# Patient Record
Sex: FEMALE | Race: BLACK OR AFRICAN AMERICAN | Employment: FULL TIME | URBAN - METROPOLITAN AREA
[De-identification: names, ages, dates, MRNs, and addresses within clinical notes are randomized per-mention and may not be internally consistent; named-entity substitution may affect disease eponyms.]

---

## 2017-01-17 ENCOUNTER — ROUTINE PRENATAL (OUTPATIENT)
Dept: OBGYN CLINIC | Age: 22
End: 2017-01-17

## 2017-01-17 ENCOUNTER — HOSPITAL ENCOUNTER (OUTPATIENT)
Dept: ULTRASOUND IMAGING | Age: 22
Discharge: HOME | End: 2017-01-17
Attending: ADVANCED PRACTICE MIDWIFE
Payer: MEDICAID

## 2017-01-17 VITALS
DIASTOLIC BLOOD PRESSURE: 72 MMHG | HEART RATE: 79 BPM | HEIGHT: 68 IN | BODY MASS INDEX: 34.89 KG/M2 | SYSTOLIC BLOOD PRESSURE: 119 MMHG | WEIGHT: 230.2 LBS

## 2017-01-17 DIAGNOSIS — Z34.01 ENCOUNTER FOR SUPERVISION OF NORMAL FIRST PREGNANCY IN FIRST TRIMESTER: ICD-10-CM

## 2017-01-17 DIAGNOSIS — Z34.01 ENCOUNTER FOR SUPERVISION OF NORMAL FIRST PREGNANCY IN FIRST TRIMESTER: Primary | ICD-10-CM

## 2017-01-17 PROCEDURE — 76817 TRANSVAGINAL US OBSTETRIC: CPT

## 2017-01-17 NOTE — PROGRESS NOTES
@ approx 9wks by LMP  Dating US scheduled for 17  No complaints, had 1 episode of vaginal spotting yesterday but none today  TV US today shows single IUP with visible fetal pole, no FHT  STAT US at [facility] today  No pain or other complaints from patient  Discussed prenatal labs - normal  Will call with US results  Pt verbalizes understanding  Visit ED if pain or bleeding today

## 2017-01-17 NOTE — PATIENT INSTRUCTIONS
Weeks 6 to 10 of Your Pregnancy: Care Instructions  Your Care Instructions    Congratulations on your pregnancy. This is an exciting and important time for you. During the first 6 to 10 weeks of your pregnancy, your body goes through many changes. Your baby grows very fast, even though you cannot feel it yet. You may start to notice that you feel different, both in your body and your emotions. Because each woman's pregnancy is unique, there is no right way to feel. You may feel the healthiest you have ever been, or you may feel tired or sick to your stomach (\"morning sickness\"). These early weeks are a time to make healthy choices and to eat the best foods for you and your baby. This care sheet will give you some ideas. This is also a good time to think about birth defects testing. These are tests done during pregnancy to look for possible problems with the baby. First trimester tests for birth defects can be done between 8 and 17 weeks of pregnancy, depending on the test. Talk with your doctor about what kinds of tests are available. Follow-up care is a key part of your treatment and safety. Be sure to make and go to all appointments, and call your doctor if you are having problems. It's also a good idea to know your test results and keep a list of the medicines you take. How can you care for yourself at home? Eat well  · Eat at least 3 meals and 2 healthy snacks every day. Eat fresh, whole foods, including:  ¨ 7 or more servings of bread, tortillas, cereal, rice, pasta, or oatmeal.  ¨ 3 or more servings of vegetables, especially leafy green vegetables. ¨ 2 or more servings of fruits. ¨ 3 or more servings of milk, yogurt, or cheese. ¨ 2 or more servings of meat, turkey, chicken, fish, eggs, or dried beans. · Drink plenty of fluids, especially water. Avoid sodas and other sweetened drinks. · Choose foods that have important vitamins for your baby, such as calcium, iron, and folate.   ¨ Dairy products, tofu, canned fish with bones, almonds, broccoli, dark leafy greens, corn tortillas, and fortified orange juice are good sources of calcium. ¨ Beef, poultry, liver, spinach, lentils, dried beans, fortified cereals, and dried fruits are rich in iron. ¨ Dark leafy greens, broccoli, asparagus, liver, fortified cereals, orange juice, peanuts, and almonds are good sources of folate. · Avoid foods that could harm your baby. ¨ Do not eat raw or undercooked meat, chicken, or fish (such as sushi or raw oysters). ¨ Do not eat raw eggs or foods that contain raw eggs, such as Caesar dressing. ¨ Do not eat soft cheeses and unpasteurized dairy foods, such as Brie, feta, or blue cheese. ¨ Do not eat fish that contains a lot of mercury, such as shark, swordfish, tilefish, or tonny mackerel. Do not eat more than 6 ounces of tuna each week. ¨ Do not eat raw sprouts, especially alfalfa sprouts. ¨ Cut down on caffeine, such as coffee, tea, and cola. Protect yourself and your baby  · Do not touch hans litter or cat feces. They can cause an infection that could harm your baby. · High body temperature can be harmful to your baby. So if you want to use a sauna or hot tub, be sure to talk to your doctor about how to use it safely. [place] with morning sickness  · Sip small amounts of water, juices, or shakes. Try drinking between meals, not with meals. · Eat 5 or 6 small meals a day. Try dry toast or crackers when you first get up, and eat breakfast a little later. · Avoid spicy, greasy, and fatty foods. · When you feel sick, open your windows or go for a short walk to get fresh air. · Try nausea wristbands. These help some women. · Tell your doctor if you think your prenatal vitamins make you sick. Where can you learn more? Go to http://jasmeet.info/. Enter G112 in the search box to learn more about \"Weeks 6 to 10 of Your Pregnancy: Care Instructions. \"  Current as of:  May 30, 2016  Content Version: 11.1  © 0842-0646 [employer]. Care instructions adapted under license by [employer] (which disclaims liability or warranty for this information). If you have questions about a medical condition or this instruction, always ask your healthcare professional. [address] any warranty or liability for your use of this information.

## 2017-01-19 ENCOUNTER — TELEPHONE (OUTPATIENT)
Dept: OBGYN CLINIC | Age: 22
End: 2017-01-19

## 2017-01-19 NOTE — TELEPHONE ENCOUNTER
Attempted to call patient at home/cell number listed to discuss US report, need for labs and plan of care. Pt did not show for yesterday's office visit. No answer, left non-detailed voice message to call our office upon receipt of voice message.

## 2017-01-26 ENCOUNTER — HOSPITAL ENCOUNTER (OUTPATIENT)
Dept: ULTRASOUND IMAGING | Age: 22
Discharge: HOME | End: 2017-01-26
Attending: ADVANCED PRACTICE MIDWIFE
Payer: MEDICAID

## 2017-01-26 ENCOUNTER — TELEPHONE (OUTPATIENT)
Dept: OBGYN CLINIC | Age: 22
End: 2017-01-26

## 2017-01-26 ENCOUNTER — HOSPITAL ENCOUNTER (OUTPATIENT)
Dept: LAB | Age: 22
Discharge: HOME | End: 2017-01-26
Payer: MEDICAID

## 2017-01-26 ENCOUNTER — OFFICE VISIT (OUTPATIENT)
Dept: OBGYN CLINIC | Age: 22
End: 2017-01-26

## 2017-01-26 DIAGNOSIS — O20.0 THREATENED MISCARRIAGE: ICD-10-CM

## 2017-01-26 DIAGNOSIS — O20.0 THREATENED MISCARRIAGE: Primary | ICD-10-CM

## 2017-01-26 DIAGNOSIS — O02.1 MISSED ABORTION: Primary | ICD-10-CM

## 2017-01-26 LAB — HCG SERPL-ACNC: 6267 MIU/ML (ref 0–10)

## 2017-01-26 PROCEDURE — 76817 TRANSVAGINAL US OBSTETRIC: CPT

## 2017-01-26 PROCEDURE — 84702 CHORIONIC GONADOTROPIN TEST: CPT | Performed by: ADVANCED PRACTICE MIDWIFE

## 2017-01-26 PROCEDURE — 36415 COLL VENOUS BLD VENIPUNCTURE: CPT | Performed by: ADVANCED PRACTICE MIDWIFE

## 2017-01-26 RX ORDER — IBUPROFEN 800 MG/1
800 TABLET ORAL
Qty: 21 TAB | Refills: 0 | Status: SHIPPED | OUTPATIENT
Start: 2017-01-26 | End: 2017-05-31

## 2017-01-26 RX ORDER — MISOPROSTOL 200 UG/1
TABLET ORAL
Qty: 4 TAB | Refills: 0 | Status: SHIPPED | OUTPATIENT
Start: 2017-01-26 | End: 2017-05-31

## 2017-01-26 NOTE — PATIENT INSTRUCTIONS

## 2017-01-26 NOTE — PROGRESS NOTES
at approx 10 wks by LMP and 9wk by 1st trim [address] at [facility] on  due to inability to see [id number] in office  No FHT detected, suspected fetal demise  Multiple attempts made to contact pt, pt did not show for f/u lab work and office visit  Patient notified of US report and need for beta HCG  Denies VB, pain, fever/chills  Beta collected, suspected cardiac flicker on abd US in office  STAT US ordered  Lab and formal US report pending  Discussed dx of threatened miscarriage  If no FHT on US and declining beta, discussed expectant management vs. cytotec induction vs. Surgical D&C  Patient will decide once results  Will call with results - pt tel # updated, brother's number is the best to reach her  Visit ED if pain or heavy bleeding  RTC in 2 days for beta hcg

## 2017-01-27 ENCOUNTER — HOSPITAL ENCOUNTER (OUTPATIENT)
Dept: LAB | Age: 22
Discharge: HOME | End: 2017-01-27
Payer: MEDICAID

## 2017-01-27 ENCOUNTER — OFFICE VISIT (OUTPATIENT)
Dept: OBGYN CLINIC | Age: 22
End: 2017-01-27

## 2017-01-27 VITALS
BODY MASS INDEX: 34.62 KG/M2 | HEIGHT: 68 IN | SYSTOLIC BLOOD PRESSURE: 119 MMHG | WEIGHT: 228.4 LBS | HEART RATE: 74 BPM | DIASTOLIC BLOOD PRESSURE: 80 MMHG

## 2017-01-27 DIAGNOSIS — O36.4XX0 FETAL DEMISE BEFORE 22 WEEKS WITH RETENTION OF DEAD FETUS: ICD-10-CM

## 2017-01-27 DIAGNOSIS — O02.1 MISSED ABORTION: Primary | ICD-10-CM

## 2017-01-27 DIAGNOSIS — O02.1 MISSED ABORTION: ICD-10-CM

## 2017-01-27 LAB — HCG SERPL-ACNC: 5609 MIU/ML (ref 0–10)

## 2017-01-27 PROCEDURE — 84702 CHORIONIC GONADOTROPIN TEST: CPT | Performed by: ADVANCED PRACTICE MIDWIFE

## 2017-01-27 NOTE — TELEPHONE ENCOUNTER
Spoke with patient via telephone using proper patient identifiers. Notified patient of US report that shows no cardiac activity and confirms fetal demise. No interval growth and decreased size. Patient denies having had any fever, chills, bleeding or pain since first US report on 1/17 that showed no cardiac activity and suspected fetal demise. Explained all options of expectant management, medical evacuation with cytotec or surgical D&C. Patient desires cytotec and would like to come into the office to have it placed. Patient reassured that this was not her fault and many first trimester missed ABs are due to chromosomal incompatibilities. Patient denies any questions and is scheduled for repeat beta HCG and cytotec insertion on 1/27 @ 9AM. She verbalizes understanding of all teaching.

## 2017-01-27 NOTE — PROGRESS NOTES
SUBJ: Ms. Pedro Singh is a 24 y.o. y/o female, , who presents today for     Chief Complaint   Patient presents with    Miscarriage       Her LMP was Patient's last menstrual period was 2016 (approximate). Koby Rothman presents for missed  and fetal demise. Demise was confirmed by two formal ultrasounds with the last one showing a decreasing fetal size. Patient has not had any bleeding or cramps x 1 week and desires medical evacuation after failed expectant management. She presents today to have cytotec placed as she is uncomfortable inserting vaginally at home. She denies fever, chills, pain or bleeding today. Mother is present with her. No past medical history on file. No past surgical history on file. Family History   Problem Relation Age of Onset    No Known Problems Mother     Hypertension Father     Hypertension Paternal Grandmother     Hypertension Paternal Grandfather      Social History     Social History    Marital status: SINGLE     Spouse name: N/A    Number of children: N/A    Years of education: N/A     Social History Main Topics    Smoking status: Current Every Day Smoker    Smokeless tobacco: Never Used    Alcohol use Yes    Drug use: Yes     Special: Marijuana    Sexual activity: Yes     Partners: Male     Birth control/ protection: None     Other Topics Concern    None     Social History Narrative         PE:   Visit Vitals    /80 (BP 1 Location: Left arm, BP Patient Position: Sitting)    Pulse 74    Ht 5' 8\" (1.727 m)    Wt 228 lb 6.4 oz (103.6 kg)    LMP 2016 (Approximate)    BMI 34.73 kg/m2       Pt is a well developed female who is alert and oriented x 3. CVS exam: normal rate, regular rhythm, normal S1, S2, no murmurs, rubs, clicks or gallops. Lungs: clear bilaterally to auscultation, no wheezing, rhonci or rales  Pelvic exam: normal external genitalia, vulva, vagina, cervix, uterus and adnexa.  Cytotec 800 mg was placed intravaginally without difficulty. Assessment/Plan:       1. Missed   Will repeat beta HCG Monday next week  - TOTAL HCG, QT.; Future    2.  Fetal demise before 22 weeks with retention of dead fetus  Discussed missed AB is not her fault, she is reassured   Discussed s/e of medical evacuation --> bleeding with possible clots and cramping  Motrin 800mg for pain management  Cautioned about retained products of conception  Visit ED if fever, chills, or increased bleeding or pain  Will try conception again after 1 menstrual cycle  She verbalizes understanding of all teaching  - TOTAL HCG, QT.; Future

## 2017-01-27 NOTE — PATIENT INSTRUCTIONS
Miscarriage: Care Instructions  Your Care Instructions    The loss of a pregnancy can be very hard. You may wonder why it happened or blame yourself. Miscarriages are common and are not caused by exercise, stress, or sex. Most happen because the fertilized egg in the uterus does not develop normally. There is no treatment that can stop a miscarriage. As long as you do not have heavy blood loss, fever, weakness, or other signs of infection, you can let a miscarriage follow its own course. This can take several days. Your body will recover over the next several weeks. Having a miscarriage does not mean you cannot have a normal pregnancy in the future. The doctor has checked you carefully, but problems can develop later. If you notice any problems or new symptoms, get medical treatment right away. Follow-up care is a key part of your treatment and safety. Be sure to make and go to all appointments, and call your doctor if you are having problems. It's also a good idea to know your test results and keep a list of the medicines you take. How can you care for yourself at home? · You will probably have some vaginal bleeding for 1 to 2 weeks. It may be similar to or slightly heavier than a normal period. The bleeding should get lighter after a week. Use pads instead of tampons. You may use tampons during your next period, which should start in 3 to 6 weeks. · Take an over-the-counter pain medicine, such as acetaminophen (Tylenol), ibuprofen (Advil, Motrin), or naproxen (Aleve) for cramps. Read and follow all instructions on the label. You may have cramps for several days after the miscarriage. · Do not take two or more pain medicines at the same time unless the doctor told you to. Many pain medicines have acetaminophen, which is Tylenol. Too much acetaminophen (Tylenol) can be harmful. · Use a clear container to save any tissue that you pass. Take it to your doctor's office as soon as you can.   · Do not have sex until the bleeding stops. · You may return to your normal activities if you feel well enough to do so. But you should avoid heavy exercise until the bleeding stops. · If you plan to get pregnant again, check with your doctor. Most doctors suggest waiting until you have had at least one normal period before you try to get pregnant. · If you do not want to get pregnant, ask your doctor about birth control. You can get pregnant again before your next period starts if you are not using birth control. · You may be low in iron because of blood loss. Eat a balanced diet that is high in iron and vitamin C. Foods rich in iron include red meat, shellfish, eggs, beans, and leafy green vegetables. Foods high in vitamin C include citrus fruits, tomatoes, and broccoli. Talk to your doctor about whether you need to take iron pills or a multivitamin. · The loss of a pregnancy can be very hard. You may wonder why it happened and blame yourself. Talking to family members, friends, a counselor, or your doctor may help you cope with your loss. When should you call for help? Call 911 anytime you think you may need emergency care. For example, call if:  · You have sudden, severe pain in your belly or pelvis. · You passed out (lost consciousness). · You have severe vaginal bleeding. Call your doctor now or seek immediate medical care if:  · You are dizzy or lightheaded, or you feel like you may faint. · You have new or increased pain in your belly or pelvis. · Your vaginal bleeding is getting worse. · You have increased pain in the vaginal area. · You have a fever. Watch closely for changes in your health, and be sure to contact your doctor if:  · You have new or worse vaginal discharge. · You do not get better as expected. Where can you learn more? Go to http://venecia-trudy.info/. Enter E802 in the search box to learn more about \"Miscarriage: Care Instructions. \"  Current as of:  May 30, 2016  Content Version: 11.1  © 9520-4423 [employer]. Care instructions adapted under license by [employer] (which disclaims liability or warranty for this information). If you have questions about a medical condition or this instruction, always ask your healthcare professional. [address] any warranty or liability for your use of this information.

## 2017-05-31 ENCOUNTER — ROUTINE PRENATAL (OUTPATIENT)
Dept: OBGYN CLINIC | Age: 22
End: 2017-05-31

## 2017-05-31 VITALS
HEART RATE: 88 BPM | HEIGHT: 68 IN | WEIGHT: 230 LBS | BODY MASS INDEX: 34.86 KG/M2 | DIASTOLIC BLOOD PRESSURE: 88 MMHG | SYSTOLIC BLOOD PRESSURE: 138 MMHG

## 2017-05-31 DIAGNOSIS — Z3A.09 9 WEEKS GESTATION OF PREGNANCY: Primary | ICD-10-CM

## 2017-05-31 RX ORDER — METRONIDAZOLE 500 MG/1
TABLET ORAL 3 TIMES DAILY
COMMUNITY
End: 2017-07-15

## 2017-05-31 NOTE — PATIENT INSTRUCTIONS
Weeks 6 to 10 of Your Pregnancy: Care Instructions  Your Care Instructions    Congratulations on your pregnancy. This is an exciting and important time for you. During the first 6 to 10 weeks of your pregnancy, your body goes through many changes. Your baby grows very fast, even though you cannot feel it yet. You may start to notice that you feel different, both in your body and your emotions. Because each woman's pregnancy is unique, there is no right way to feel. You may feel the healthiest you have ever been, or you may feel tired or sick to your stomach (\"morning sickness\"). These early weeks are a time to make healthy choices and to eat the best foods for you and your baby. This care sheet will give you some ideas. This is also a good time to think about birth defects testing. These are tests done during pregnancy to look for possible problems with the baby. First trimester tests for birth defects can be done between 8 and 17 weeks of pregnancy, depending on the test. Talk with your doctor about what kinds of tests are available. Follow-up care is a key part of your treatment and safety. Be sure to make and go to all appointments, and call your doctor if you are having problems. It's also a good idea to know your test results and keep a list of the medicines you take. How can you care for yourself at home? Eat well  · Eat at least 3 meals and 2 healthy snacks every day. Eat fresh, whole foods, including:  ¨ 7 or more servings of bread, tortillas, cereal, rice, pasta, or oatmeal.  ¨ 3 or more servings of vegetables, especially leafy green vegetables. ¨ 2 or more servings of fruits. ¨ 3 or more servings of milk, yogurt, or cheese. ¨ 2 or more servings of meat, turkey, chicken, fish, eggs, or dried beans. · Drink plenty of fluids, especially water. Avoid sodas and other sweetened drinks. · Choose foods that have important vitamins for your baby, such as calcium, iron, and folate.   ¨ Dairy products, tofu, canned fish with bones, almonds, broccoli, dark leafy greens, corn tortillas, and fortified orange juice are good sources of calcium. ¨ Beef, poultry, liver, spinach, lentils, dried beans, fortified cereals, and dried fruits are rich in iron. ¨ Dark leafy greens, broccoli, asparagus, liver, fortified cereals, orange juice, peanuts, and almonds are good sources of folate. · Avoid foods that could harm your baby. ¨ Do not eat raw or undercooked meat, chicken, or fish (such as sushi or raw oysters). ¨ Do not eat raw eggs or foods that contain raw eggs, such as Caesar dressing. ¨ Do not eat soft cheeses and unpasteurized dairy foods, such as Brie, feta, or blue cheese. ¨ Do not eat fish that contains a lot of mercury, such as shark, swordfish, tilefish, or tonny mackerel. Do not eat more than 6 ounces of tuna each week. ¨ Do not eat raw sprouts, especially alfalfa sprouts. ¨ Cut down on caffeine, such as coffee, tea, and cola. Protect yourself and your baby  · Do not touch hans litter or cat feces. They can cause an infection that could harm your baby. · High body temperature can be harmful to your baby. So if you want to use a sauna or hot tub, be sure to talk to your doctor about how to use it safely. [place] with morning sickness  · Sip small amounts of water, juices, or shakes. Try drinking between meals, not with meals. · Eat 5 or 6 small meals a day. Try dry toast or crackers when you first get up, and eat breakfast a little later. · Avoid spicy, greasy, and fatty foods. · When you feel sick, open your windows or go for a short walk to get fresh air. · Try nausea wristbands. These help some women. · Tell your doctor if you think your prenatal vitamins make you sick. Where can you learn more? Go to http://jasmeet.info/. Enter G112 in the search box to learn more about \"Weeks 6 to 10 of Your Pregnancy: Care Instructions. \"  Current as of:  May 30, 2016  Content Version: 11.2  © 2556-6267 [employer]. Care instructions adapted under license by [employer] (which disclaims liability or warranty for this information). If you have questions about a medical condition or this instruction, always ask your healthcare professional. [address] any warranty or liability for your use of this information. Learning About Birth Defects Testing  What is birth defects testing? Birth defects testing is done during pregnancy to look for possible problems with the baby (fetus). A birth defect may have only a minor impact on a child's life. Or it can have a major effect on quality or length of life. You and your doctor can choose from many tests. You may have no tests, one test, or many tests. Talking with a genetic counselor may help you make decisions about testing. The counselor is trained to help you understand these tests. He or she can also help you find resources for support. What are the types of tests? You may have a screening test or a diagnostic test. Or you may have both types of tests. Screening tests show the chance that a baby has a certain birth defect, such as Down syndrome, spina bifida, or trisomy 25. Diagnostic tests show if a baby has a certain birth defect. · Screening tests and when they are done:  ¨ Blood tests at 10 to 13 weeks (first trimester)  ¨ Cell free fetal DNA test at 10 weeks or later (first trimester)  ¨ Nuchal translucency test at 11 to 14 weeks (first trimester)  ¨ Triple or quad screening at 15 to 20 weeks (second trimester)  ¨ Ultrasound at 18 to 20 weeks (second trimester)  · Diagnostic tests and when they are done:  ¨ Chorionic villus sampling (CVS) at 10 to 12 weeks (first trimester)  ¨ Amniocentesis at 13 to 20 weeks (second trimester)  In some cases, the doctors look at the combined screenings that you've had over a period of time. This is called an integrated screening.   What are the screening tests? · Blood tests are done to look at different substances in your blood. These tests include cell free fetal DNA and triple or quadruple (quad) blood tests. Both of these tests can help find genetic problems. · Nuchal translucency test uses ultrasound to measure the thickness of the area at the back of the baby's neck. An increase in thickness can be an early sign of certain birth defects. Ultrasound is a tool that uses sound waves to make pictures of your baby and placenta inside the uterus. · Ultrasound lets your doctor see an image of your baby. It can help your doctor look for problems of the heart, spine, belly, or other areas. What are the diagnostic tests? Chorionic villus sampling (CVS) looks at cells from the placenta. To do the test, your doctor may put a thin tube through your vagina and cervix to take out a small piece of the placenta. Or the doctor may take out the piece through a needle in your belly. This test can diagnose many genetic diseases. But it can't find problems with the spinal cord. Amniocentesis looks at the amniotic fluid that surrounds your baby. Your doctor will put a needle through your belly into your uterus and take out a very small amount of fluid to test.  What are the risks of these tests? There is a small risk of a miscarriage after a CVS or amniocentesis. Your doctor or genetic counselor can help you understand this risk. These tests are generally very safe. Where can you learn more? Go to http://venecia-trudy.info/. Enter G030 in the search box to learn more about \"Learning About Birth Defects Testing. \"  Current as of: June 30, 2016  Content Version: 11.2  © 1476-5367 [employer]. Care instructions adapted under license by [employer] (which disclaims liability or warranty for this information).  If you have questions about a medical condition or this instruction, always ask your healthcare professional. [employer] disclaims any warranty or liability for your use of this information.

## 2017-05-31 NOTE — PROGRESS NOTES
9.3 Weeks  Dated by LMP c/w 9 week US  See flow sheet  1st trimester testing desired  Refer to [facility]  RTC 4 weeks

## 2017-05-31 NOTE — MR AVS SNAPSHOT
Visit Information Date & Time Provider Department Dept. Phone Encounter #  
 5/31/2017  9:30 AM Anahi Reyes [place] [id number] Follow-up Instructions Return in about 4 weeks (around 6/28/2017). Upcoming Health Maintenance Date Due  
 HPV AGE 9Y-34Y (1 of 3 - Female 3 Dose Series) 12/4/2006 INFLUENZA AGE 9 TO ADULT 8/1/2017 PAP AKA CERVICAL CYTOLOGY 12/20/2019 Allergies as of 5/31/2017  Review Complete On: 5/31/2017 By: Anahi Reyes MD  
 No Known Allergies Current Immunizations  Never Reviewed No immunizations on file. Not reviewed this visit You Were Diagnosed With   
  
 Codes Comments 9 weeks gestation of pregnancy    -  Primary ICD-10-CM: Z3A.09 
ICD-9-CM: V22.2 Vitals BP Pulse Height(growth percentile) Weight(growth percentile) LMP Breastfeeding? 138/88 (BP 1 Location: Right arm, BP Patient Position: Sitting) 88 5' 8\" (1.727 m) 230 lb (104.3 kg) 03/26/2017 Unknown BMI OB Status Smoking Status 34.97 kg/m2 Pregnant Former Smoker BMI and BSA Data Body Mass Index Body Surface Area 34.97 kg/m 2 2.24 m 2 Preferred Pharmacy Pharmacy Name Phone [facility] [phone] Your Updated Medication List  
  
   
This list is accurate as of: 5/31/17 11:13 AM.  Always use your most recent med list.  
  
  
  
  
 FLAGYL 500 mg tablet Generic drug:  metroNIDAZOLE Take  by mouth three (3) times daily. prenatal vit-iron fumarate-fa 27-0.8 mg Tab tablet Commonly known as:  PRENATAL VITAMIN Take 1 Tab by mouth daily. Follow-up Instructions Return in about 4 weeks (around 6/28/2017). Patient Instructions Weeks 6 to 10 of Your Pregnancy: Care Instructions Your Care Instructions Congratulations on your pregnancy. This is an exciting and important time for you. During the first 6 to 10 weeks of your pregnancy, your body goes through many changes. Your baby grows very fast, even though you cannot feel it yet. You may start to notice that you feel different, both in your body and your emotions. Because each woman's pregnancy is unique, there is no right way to feel. You may feel the healthiest you have ever been, or you may feel tired or sick to your stomach (\"morning sickness\"). These early weeks are a time to make healthy choices and to eat the best foods for you and your baby. This care sheet will give you some ideas. This is also a good time to think about birth defects testing. These are tests done during pregnancy to look for possible problems with the baby. First trimester tests for birth defects can be done between 8 and 17 weeks of pregnancy, depending on the test. Talk with your doctor about what kinds of tests are available. Follow-up care is a key part of your treatment and safety. Be sure to make and go to all appointments, and call your doctor if you are having problems. It's also a good idea to know your test results and keep a list of the medicines you take. How can you care for yourself at home? Eat well · Eat at least 3 meals and 2 healthy snacks every day. Eat fresh, whole foods, including: ¨ 7 or more servings of bread, tortillas, cereal, rice, pasta, or oatmeal. 
¨ 3 or more servings of vegetables, especially leafy green vegetables. ¨ 2 or more servings of fruits. ¨ 3 or more servings of milk, yogurt, or cheese. ¨ 2 or more servings of meat, turkey, chicken, fish, eggs, or dried beans. · Drink plenty of fluids, especially water. Avoid sodas and other sweetened drinks. · Choose foods that have important vitamins for your baby, such as calcium, iron, and folate. ¨ Dairy products, tofu, canned fish with bones, almonds, broccoli, dark leafy greens, corn tortillas, and fortified orange juice are good sources of calcium. ¨ Beef, poultry, liver, spinach, lentils, dried beans, fortified cereals, and dried fruits are rich in iron. ¨ Dark leafy greens, broccoli, asparagus, liver, fortified cereals, orange juice, peanuts, and almonds are good sources of folate. · Avoid foods that could harm your baby. ¨ Do not eat raw or undercooked meat, chicken, or fish (such as sushi or raw oysters). ¨ Do not eat raw eggs or foods that contain raw eggs, such as Caesar dressing. ¨ Do not eat soft cheeses and unpasteurized dairy foods, such as Brie, feta, or blue cheese. ¨ Do not eat fish that contains a lot of mercury, such as shark, swordfish, tilefish, or tonny mackerel. Do not eat more than 6 ounces of tuna each week. ¨ Do not eat raw sprouts, especially alfalfa sprouts. ¨ Cut down on caffeine, such as coffee, tea, and cola. Protect yourself and your baby · Do not touch hans litter or cat feces. They can cause an infection that could harm your baby. · High body temperature can be harmful to your baby. So if you want to use a sauna or hot tub, be sure to talk to your doctor about how to use it safely. [place] with morning sickness · Sip small amounts of water, juices, or shakes. Try drinking between meals, not with meals. · Eat 5 or 6 small meals a day. Try dry toast or crackers when you first get up, and eat breakfast a little later. · Avoid spicy, greasy, and fatty foods. · When you feel sick, open your windows or go for a short walk to get fresh air. · Try nausea wristbands. These help some women. · Tell your doctor if you think your prenatal vitamins make you sick. Where can you learn more? Go to http://venecia-trudy.info/. Enter G112 in the search box to learn more about \"Weeks 6 to 10 of Your Pregnancy: Care Instructions. \" Current as of: May 30, 2016 Content Version: 11.2 © 9935-9807 [employer].  Care instructions adapted under license by [address] (which disclaims liability or warranty for this information). If you have questions about a medical condition or this instruction, always ask your healthcare professional. [address] any warranty or liability for your use of this information. Learning About Birth Defects Testing What is birth defects testing? Birth defects testing is done during pregnancy to look for possible problems with the baby (fetus). A birth defect may have only a minor impact on a child's life. Or it can have a major effect on quality or length of life. You and your doctor can choose from many tests. You may have no tests, one test, or many tests. Talking with a genetic counselor may help you make decisions about testing. The counselor is trained to help you understand these tests. He or she can also help you find resources for support. What are the types of tests? You may have a screening test or a diagnostic test. Or you may have both types of tests. Screening tests show the chance that a baby has a certain birth defect, such as Down syndrome, spina bifida, or trisomy 25. Diagnostic tests show if a baby has a certain birth defect. · Screening tests and when they are done: ¨ Blood tests at 10 to 13 weeks (first trimester) ¨ Cell free fetal DNA test at 10 weeks or later (first trimester) ¨ Nuchal translucency test at 11 to 14 weeks (first trimester) ¨ Triple or quad screening at 15 to 20 weeks (second trimester) ¨ Ultrasound at 18 to 20 weeks (second trimester) · Diagnostic tests and when they are done: ¨ Chorionic villus sampling (CVS) at 10 to 12 weeks (first trimester) ¨ Amniocentesis at 13 to 20 weeks (second trimester) In some cases, the doctors look at the combined screenings that you've had over a period of time. This is called an integrated screening. What are the screening tests? · Blood tests are done to look at different substances in your blood. These tests include cell free fetal DNA and triple or quadruple (quad) blood tests. Both of these tests can help find genetic problems. · Nuchal translucency test uses ultrasound to measure the thickness of the area at the back of the baby's neck. An increase in thickness can be an early sign of certain birth defects. Ultrasound is a tool that uses sound waves to make pictures of your baby and placenta inside the uterus. · Ultrasound lets your doctor see an image of your baby. It can help your doctor look for problems of the heart, spine, belly, or other areas. What are the diagnostic tests? Chorionic villus sampling (CVS) looks at cells from the placenta. To do the test, your doctor may put a thin tube through your vagina and cervix to take out a small piece of the placenta. Or the doctor may take out the piece through a needle in your belly. This test can diagnose many genetic diseases. But it can't find problems with the spinal cord. Amniocentesis looks at the amniotic fluid that surrounds your baby. Your doctor will put a needle through your belly into your uterus and take out a very small amount of fluid to test. 
What are the risks of these tests? There is a small risk of a miscarriage after a CVS or amniocentesis. Your doctor or genetic counselor can help you understand this risk. These tests are generally very safe. Where can you learn more? Go to http://venecia-trudy.info/. Enter G030 in the search box to learn more about \"Learning About Birth Defects Testing. \" Current as of: June 30, 2016 Content Version: 11.2 © 4047-4653 [employer]. Care instructions adapted under license by [employer] (which disclaims liability or warranty for this information). If you have questions about a medical condition or this instruction, always ask your healthcare professional. [address] any warranty or liability for your use of this information. Please provide this summary of care documentation to your next provider. Your primary care clinician is listed as Destiny Cedeño. If you have any questions after today's visit, please call [phone].

## 2017-06-01 DIAGNOSIS — Z3A.09 9 WEEKS GESTATION OF PREGNANCY: ICD-10-CM

## 2017-06-01 LAB
ANTIBODY SCREEN, EXTERNAL: NEGATIVE
CHLAMYDIA TRACHOMATIS THINPREP, 13342: NEGATIVE
HBSAG, EXTERNAL: NEGATIVE
NEISSERIA GONORRHOEAE THINPREP, 13343: NEGATIVE
PAP IMAGE GUIDED, 8900296: NORMAL
RUBELLA, EXTERNAL: NORMAL
T. PALLIDUM, EXTERNAL: NON REACTIVE
TRICHOMONAS NUC AMP-THIN PREP,13357: NEGATIVE
TYPE, ABO & RH, EXTERNAL: NORMAL

## 2017-06-17 ENCOUNTER — APPOINTMENT (OUTPATIENT)
Dept: ULTRASOUND IMAGING | Age: 22
End: 2017-06-17
Attending: PHYSICIAN ASSISTANT
Payer: MEDICAID

## 2017-06-17 ENCOUNTER — HOSPITAL ENCOUNTER (EMERGENCY)
Age: 22
Discharge: HOME | End: 2017-06-17
Attending: EMERGENCY MEDICINE | Admitting: EMERGENCY MEDICINE
Payer: MEDICAID

## 2017-06-17 VITALS
WEIGHT: 220 LBS | BODY MASS INDEX: 33.45 KG/M2 | RESPIRATION RATE: 14 BRPM | HEART RATE: 88 BPM | OXYGEN SATURATION: 98 % | SYSTOLIC BLOOD PRESSURE: 125 MMHG | DIASTOLIC BLOOD PRESSURE: 85 MMHG | TEMPERATURE: 98.9 F

## 2017-06-17 DIAGNOSIS — N76.0 BV (BACTERIAL VAGINOSIS): ICD-10-CM

## 2017-06-17 DIAGNOSIS — B96.89 BV (BACTERIAL VAGINOSIS): ICD-10-CM

## 2017-06-17 DIAGNOSIS — O20.0 THREATENED MISCARRIAGE: Primary | ICD-10-CM

## 2017-06-17 LAB
ALBUMIN SERPL BCP-MCNC: 3.5 G/DL (ref 3.4–5)
ALBUMIN/GLOB SERPL: 1 {RATIO} (ref 0.8–1.7)
ALP SERPL-CCNC: 44 U/L (ref 45–117)
ALT SERPL-CCNC: 15 U/L (ref 13–56)
ANION GAP BLD CALC-SCNC: 7 MMOL/L (ref 3–18)
APPEARANCE UR: ABNORMAL
AST SERPL W P-5'-P-CCNC: 12 U/L (ref 15–37)
BACTERIA URNS QL MICRO: ABNORMAL /HPF
BASOPHILS # BLD AUTO: 0 K/UL (ref 0–0.1)
BASOPHILS # BLD: 0 % (ref 0–2)
BILIRUB SERPL-MCNC: 0.4 MG/DL (ref 0.2–1)
BILIRUB UR QL: NEGATIVE
BUN SERPL-MCNC: 5 MG/DL (ref 7–18)
BUN/CREAT SERPL: 7 (ref 12–20)
CALCIUM SERPL-MCNC: 9.5 MG/DL (ref 8.5–10.1)
CHLORIDE SERPL-SCNC: 106 MMOL/L (ref 100–108)
CO2 SERPL-SCNC: 25 MMOL/L (ref 21–32)
COLOR UR: ABNORMAL
CREAT SERPL-MCNC: 0.73 MG/DL (ref 0.6–1.3)
DIFFERENTIAL METHOD BLD: ABNORMAL
EOSINOPHIL # BLD: 0.1 K/UL (ref 0–0.4)
EOSINOPHIL NFR BLD: 1 % (ref 0–5)
EPITH CASTS URNS QL MICRO: ABNORMAL /LPF (ref 0–5)
ERYTHROCYTE [DISTWIDTH] IN BLOOD BY AUTOMATED COUNT: 13.7 % (ref 11.6–14.5)
GLOBULIN SER CALC-MCNC: 3.4 G/DL (ref 2–4)
GLUCOSE SERPL-MCNC: 80 MG/DL (ref 74–99)
GLUCOSE UR STRIP.AUTO-MCNC: NEGATIVE MG/DL
HCG SERPL QL: POSITIVE
HCG SERPL-ACNC: ABNORMAL MIU/ML (ref 1–6)
HCT VFR BLD AUTO: 35.9 % (ref 35–45)
HGB BLD-MCNC: 12.6 G/DL (ref 12–16)
HGB UR QL STRIP: ABNORMAL
KETONES UR QL STRIP.AUTO: NEGATIVE MG/DL
LEUKOCYTE ESTERASE UR QL STRIP.AUTO: ABNORMAL
LYMPHOCYTES # BLD AUTO: 24 % (ref 21–52)
LYMPHOCYTES # BLD: 3 K/UL (ref 0.9–3.6)
MCH RBC QN AUTO: 28.6 PG (ref 24–34)
MCHC RBC AUTO-ENTMCNC: 35.1 G/DL (ref 31–37)
MCV RBC AUTO: 81.4 FL (ref 74–97)
MONOCYTES # BLD: 1.2 K/UL (ref 0.05–1.2)
MONOCYTES NFR BLD AUTO: 9 % (ref 3–10)
NEUTS SEG # BLD: 8.4 K/UL (ref 1.8–8)
NEUTS SEG NFR BLD AUTO: 66 % (ref 40–73)
NITRITE UR QL STRIP.AUTO: NEGATIVE
PH UR STRIP: 6 [PH] (ref 5–8)
PLATELET # BLD AUTO: 283 K/UL (ref 135–420)
PMV BLD AUTO: 9.2 FL (ref 9.2–11.8)
POTASSIUM SERPL-SCNC: 3.7 MMOL/L (ref 3.5–5.5)
PROT SERPL-MCNC: 6.9 G/DL (ref 6.4–8.2)
PROT UR STRIP-MCNC: 100 MG/DL
RBC # BLD AUTO: 4.41 M/UL (ref 4.2–5.3)
RBC #/AREA URNS HPF: ABNORMAL /HPF (ref 0–5)
SERVICE CMNT-IMP: NORMAL
SODIUM SERPL-SCNC: 138 MMOL/L (ref 136–145)
SP GR UR REFRACTOMETRY: 1.01 (ref 1–1.03)
UROBILINOGEN UR QL STRIP.AUTO: 0.2 EU/DL (ref 0.2–1)
WBC # BLD AUTO: 12.7 K/UL (ref 4.6–13.2)
WBC URNS QL MICRO: ABNORMAL /HPF (ref 0–4)
WET PREP GENITAL: NORMAL

## 2017-06-17 PROCEDURE — 99284 EMERGENCY DEPT VISIT MOD MDM: CPT

## 2017-06-17 PROCEDURE — 87491 CHLMYD TRACH DNA AMP PROBE: CPT | Performed by: PHYSICIAN ASSISTANT

## 2017-06-17 PROCEDURE — 84702 CHORIONIC GONADOTROPIN TEST: CPT | Performed by: PHYSICIAN ASSISTANT

## 2017-06-17 PROCEDURE — 76801 OB US < 14 WKS SINGLE FETUS: CPT

## 2017-06-17 PROCEDURE — 80053 COMPREHEN METABOLIC PANEL: CPT | Performed by: EMERGENCY MEDICINE

## 2017-06-17 PROCEDURE — 81001 URINALYSIS AUTO W/SCOPE: CPT | Performed by: EMERGENCY MEDICINE

## 2017-06-17 PROCEDURE — 84703 CHORIONIC GONADOTROPIN ASSAY: CPT | Performed by: PHYSICIAN ASSISTANT

## 2017-06-17 PROCEDURE — 87210 SMEAR WET MOUNT SALINE/INK: CPT | Performed by: PHYSICIAN ASSISTANT

## 2017-06-17 PROCEDURE — 85025 COMPLETE CBC W/AUTO DIFF WBC: CPT | Performed by: EMERGENCY MEDICINE

## 2017-06-17 PROCEDURE — 88305 TISSUE EXAM BY PATHOLOGIST: CPT | Performed by: EMERGENCY MEDICINE

## 2017-06-17 RX ORDER — METRONIDAZOLE 500 MG/1
500 TABLET ORAL 2 TIMES DAILY
Qty: 14 TAB | Refills: 0 | Status: SHIPPED | OUTPATIENT
Start: 2017-06-17 | End: 2017-06-24

## 2017-06-17 NOTE — ED TRIAGE NOTES
Pt is 3 months pregnant, states she woke up this morning to find blood in the bed and on toilet paper when she wiped, \"burgendy colored\" on bed but bright red when she wiped, stated it didn't look like period blood states the pt. Pt also c/o 5/10 lower abdominal pain that started on the way here which she describes as sharp. Pt has had adequate prenatal care.

## 2017-06-17 NOTE — ED PROVIDER NOTES
HPI Comments: Mayra Diez is a 24 y.o. female [id number] at approximately 3 months gestation who presents to the emergency department for evaluation of vaginal bleeding and pelvic pain in pregnancy. Pt states she awoke tonight with a wet spot of blood on the mattress and then noticed bright red blood when she wiped. She relates one prior episode of bleeding approximately 3-4 weeks ago, but states nothing this heavy. She states she had an US at [facility] confirming IUP at that time, but there is no . She sees OB Jaswant and another OB at that practice. Pt reports she had a miscarriage back in January at approximately 8-9 weeks gestation and was told it was secondary to [address]. Pt denies any vaginal discharge/odor, fevers or chills, headache, dizziness or light headedness, ENT issues, CP or discomfort, SOB, cough, n/v/d/c, back pain, dysuria, frequency, focal weakness/numbness/tingling, or rash. Patient has no other complaints at this time. LMP 3/26/17. PCP:  Abiola Gold CNM        Patient is a 24 y.o. female presenting with vaginal bleeding. Vaginal Bleeding   Pertinent negatives include no chest pain, no headaches and no shortness of breath. History reviewed. No pertinent past medical history. History reviewed. No pertinent surgical history. Family History:   Problem Relation Age of Onset    No Known Problems Mother     Hypertension Father     Hypertension Paternal Grandmother     Stroke Paternal Grandmother     Hypertension Paternal Grandfather        Social History     Social History    Marital status: SINGLE     Spouse name: N/A    Number of children: N/A    Years of education: N/A     Occupational History    Not on file.      Social History Main Topics    Smoking status: Former Smoker     Quit date: 2017    Smokeless tobacco: Never Used    Alcohol use No    Drug use: Yes     Special: Marijuana      Comment: smokes daily    Sexual activity: Yes Partners: Male     Birth control/ protection: None     Other Topics Concern    Not on file     Social History Narrative         ALLERGIES: Review of patient's allergies indicates no known allergies. Review of Systems   Constitutional: Negative for chills and fever. HENT: Negative for congestion, rhinorrhea and sore throat. Respiratory: Negative for cough and shortness of breath. Cardiovascular: Negative for chest pain. Gastrointestinal: Negative for constipation, diarrhea, nausea and vomiting. Genitourinary: Positive for pelvic pain and vaginal bleeding. Negative for dysuria, frequency and hematuria. Musculoskeletal: Negative for back pain and myalgias. Skin: Negative for rash and wound. Neurological: Negative for dizziness and headaches. Vitals:    06/17/17 0534 06/17/17 0545 06/17/17 0700   BP: 128/87 (!) 123/98 125/85   Pulse: 87  88   Resp: 16  14   Temp: 98.9 °F (37.2 °C)     SpO2: 100% 100% 98%   Weight: 99.8 kg (220 lb)              Physical Exam   Constitutional: She is oriented to person, place, and time. She appears well-developed and well-nourished. No distress. HENT:   Head: Normocephalic and atraumatic. Eyes: Conjunctivae are normal.   Neck: Normal range of motion. Neck supple. Cardiovascular: Normal rate, regular rhythm and normal heart sounds. Exam reveals no gallop and no friction rub. No murmur heard. Pulmonary/Chest: Effort normal and breath sounds normal. No respiratory distress. She has no wheezes. She has no rales. She exhibits no tenderness. Abdominal: Soft. Bowel sounds are normal. She exhibits no distension and no mass. There is tenderness. There is no rebound and no guarding. Tenderness to deep palpation noted to suprapubic region. No rebound, rigidity, guarding, distention, or mass noted. (-) Carson's sign. (-) Rovsing's sign. Normoactive BS all four quadrants.      Genitourinary:   Genitourinary Comments: Pelvic Exam:    Speculum and bimanual pelvic exam performed at bedside with chaperone assistance of Daxa Aquino [facility]. Normal exam of external labia majora and minora without apparent rash or lesions. There was moderate amount of dark red vaginal bleeding with silver dollar sized clot with what appears to be tissue in vault. However, cervical os was closed. No adnexal masses or tenderness appreciated. Patient tolerated procedure well. No CMT. Cytology and samples obtained for GC/Chlamydia and wet prep, labeled at bedside and sent to lab.  6:25 AM     Musculoskeletal: She exhibits no edema or deformity. Neurological: She is alert and oriented to person, place, and time. She has normal reflexes. Skin: Skin is warm and dry. She is not diaphoretic. Psychiatric: She has a normal mood and affect. Nursing note and vitals reviewed. MDM  Number of Diagnoses or Management Options  BV (bacterial vaginosis): new and requires workup  Threatened miscarriage: new and requires workup  Diagnosis management comments: Differential Diagnosis:  Menses, anovulation/DUB, spontaneous , threatened , pregnancy, uterine leiomyomas, cervical or endometrial polyps, pelvic tumors, atrophic endometrium, systemic disorders, hypothyroidism, pelvic infections, exogenous hormone use, coagulopathy, trauma    Plan: Pt presents in NAD, vitals wnl. Silver dollar sized solid clot with questionable formed tissue noted in vaginal vault on speculum exam.  Closed os. This was sent to lab. Pt is Rh positive. H&H adequate. Qt will not be back prior to discharge as this was a send-out today per lab. Cannot confirm IUP by prior ultrasound in our system or [facility] for this pregnancy. TV US today confirms viable 11w5d IUP without abnormality. Wet prep with BV. Will treat. Advised on pelvic rest and no intercourse. Follow-up closely with OB in 2 days. At this time, patient is stable and appropriate for discharge home.   Patient demonstrates understanding of current diagnoses and is in agreement with the treatment plan. They are advised that while the likelihood of serious underlying condition is low at this point given the evaluation performed today, we cannot fully rule it out. They are advised to immediately return with any new symptoms or worsening of current condition. All questions have been answered. Patient is given educational material regarding their diagnoses, including danger symptoms and when to return to the ED. Amount and/or Complexity of Data Reviewed  Clinical lab tests: ordered and reviewed  Tests in the radiology section of CPT®: ordered and reviewed  Review and summarize past medical records: yes    Risk of Complications, Morbidity, and/or Mortality  Presenting problems: moderate  Diagnostic procedures: high  Management options: moderate    Patient Progress  Patient progress: improved    ED Course       Procedures    -------------------------------------------------------------------------------------------------------------------  Orders:  Orders Placed This Encounter    WET PREP     Standing Status:   Standing     Number of Occurrences:   1    US PREG UTS < 14 WKS SNGL     Standing Status:   Standing     Number of Occurrences:   1     Order Specific Question:   Transport     Answer:   Wheelchair [7]     Order Specific Question:   Is Patient Pregnant? Answer:    Yes    CBC WITH AUTOMATED DIFF     Standing Status:   Standing     Number of Occurrences:   1    METABOLIC PANEL, COMPREHENSIVE     Standing Status:   Standing     Number of Occurrences:   1    URINALYSIS W/ RFLX MICROSCOPIC     Standing Status:   Standing     Number of Occurrences:   1    TOTAL HCG, QT.     Standing Status:   Standing     Number of Occurrences:   1    CHLAMYDIA/NEISSERIA AMPLIFICATION     Standing Status:   Standing     Number of Occurrences:   1     Order Specific Question:   Specimen type/source     Answer:   Endocervical [538]    URINE MICROSCOPIC ONLY     Standing Status:   Standing     Number of Occurrences:   1    HCG QL SERUM     Standing Status:   Standing     Number of Occurrences:   1    PATHOLOGY SPECIMEN TO LAB     Possible products of conception     Standing Status:   Standing     Number of Occurrences:   1    metroNIDAZOLE (FLAGYL) 500 mg tablet     Sig: Take 1 Tab by mouth two (2) times a day for 7 days. Dispense:  14 Tab     Refill:  0        Lab Results:   Recent Results (from the past 12 hour(s))   URINALYSIS W/ RFLX MICROSCOPIC    Collection Time: 06/17/17  5:40 AM   Result Value Ref Range    Color RED      Appearance CLOUDY      Specific gravity 1.013 1.005 - 1.030      pH (UA) 6.0 5.0 - 8.0      Protein 100 (A) NEG mg/dL    Glucose NEGATIVE  NEG mg/dL    Ketone NEGATIVE  NEG mg/dL    Bilirubin NEGATIVE  NEG      Blood LARGE (A) NEG      Urobilinogen 0.2 0.2 - 1.0 EU/dL    Nitrites NEGATIVE  NEG      Leukocyte Esterase TRACE (A) NEG     URINE MICROSCOPIC ONLY    Collection Time: 06/17/17  5:40 AM   Result Value Ref Range    WBC 0 to 3 0 - 4 /hpf    RBC TOO NUMEROUS TO COUNT 0 - 5 /hpf    Epithelial cells 2+ 0 - 5 /lpf    Bacteria 2+ (A) NEG /hpf   CBC WITH AUTOMATED DIFF    Collection Time: 06/17/17  5:53 AM   Result Value Ref Range    WBC 12.7 4.6 - 13.2 K/uL    RBC 4.41 4.20 - 5.30 M/uL    HGB 12.6 12.0 - 16.0 g/dL    HCT 35.9 35.0 - 45.0 %    MCV 81.4 74.0 - 97.0 FL    MCH 28.6 24.0 - 34.0 PG    MCHC 35.1 31.0 - 37.0 g/dL    RDW 13.7 11.6 - 14.5 %    PLATELET 116 038 - 779 K/uL    MPV 9.2 9.2 - 11.8 FL    NEUTROPHILS 66 40 - 73 %    LYMPHOCYTES 24 21 - 52 %    MONOCYTES 9 3 - 10 %    EOSINOPHILS 1 0 - 5 %    BASOPHILS 0 0 - 2 %    ABS. NEUTROPHILS 8.4 (H) 1.8 - 8.0 K/UL    ABS. LYMPHOCYTES 3.0 0.9 - 3.6 K/UL    ABS. MONOCYTES 1.2 0.05 - 1.2 K/UL    ABS. EOSINOPHILS 0.1 0.0 - 0.4 K/UL    ABS.  BASOPHILS 0.0 0.0 - 0.1 K/UL    DF AUTOMATED     METABOLIC PANEL, COMPREHENSIVE    Collection Time: 06/17/17  5:53 AM   Result Value Ref Range Sodium 138 136 - 145 mmol/L    Potassium 3.7 3.5 - 5.5 mmol/L    Chloride 106 100 - 108 mmol/L    CO2 25 21 - 32 mmol/L    Anion gap 7 3.0 - 18 mmol/L    Glucose 80 74 - 99 mg/dL    BUN 5 (L) 7.0 - 18 MG/DL    Creatinine 0.73 0.6 - 1.3 MG/DL    BUN/Creatinine ratio 7 (L) 12 - 20      GFR est AA >60 >60 ml/min/1.73m2    GFR est non-AA >60 >60 ml/min/1.73m2    Calcium 9.5 8.5 - 10.1 MG/DL    Bilirubin, total 0.4 0.2 - 1.0 MG/DL    ALT (SGPT) 15 13 - 56 U/L    AST (SGOT) 12 (L) 15 - 37 U/L    Alk. phosphatase 44 (L) 45 - 117 U/L    Protein, total 6.9 6.4 - 8.2 g/dL    Albumin 3.5 3.4 - 5.0 g/dL    Globulin 3.4 2.0 - 4.0 g/dL    A-G Ratio 1.0 0.8 - 1.7     TOTAL HCG, QT. Collection Time: 17  5:53 AM   Result Value Ref Range    [place] [address] (H) 1.0 - 6.0 MIU/ML   HCG QL SERUM    Collection Time: 17  5:53 AM   Result Value Ref Range    HCG, Ql. POSITIVE (A) NEG     WET PREP    Collection Time: 17  6:51 AM   Result Value Ref Range    Special Requests: NO SPECIAL REQUESTS      Wet prep MODERATE  CLUE CELLS PRESENT        Wet prep NO TRICHOMONAS SEEN      Wet prep NO YEAST SEEN         Radiology Results:  Us Preg Uts < 14 Wks Sngl    Result Date: 2017  CPT 27588 Limited OB Ultrasound Clinical history: Pregnant patient with vaginal bleeding that started at 5:00 AM today. . Unknown LMP. Technique: Sonographic imaging of the gravid uterus Comparison: No priors Findings: Single living intrauterine gestation is present. Crown-rump length measures 5.1 cm compatible with 11 week and 5 day gestation. Gestational sac measures 6 cm compatible with 12 week and 1 day gestation. Fetal cardiac activity is measured at 150 bpm. It is too early to evaluate for location of the placenta; no abnormal fluid collections. Amniotic fluid appears adequate. Overall the uterus measures 12.6 x 6.5 x 7.9 cm. No adnexal masses. Ovaries not identified. Included parts of the urinary bladder are unremarkable. IMPRESSION: Single living intrauterine gestation of expected age 5 weeks and 5 days +/- 10 days based on crown-rump length. No acute abnormalities are seen. Progress Notes:  6:26 AM:  Latasha Courtney PA-C was at the pt's bedside, assessed the pt and answered the pt's questions regarding treatment.    -------------------------------------------------------------------------------------------------------------------    Disposition:  Diagnosis:   1. Threatened miscarriage    2. BV (bacterial vaginosis)        Disposition: SD Home    Follow-up Information     Follow up With Details Comments Contact Info    Cornelius Uribe CNM Call in 2 days For follow-up [address]  [address] [facility] [address] [facility] EMERGENCY DEPT Go to As needed, If symptoms worsen [address] [place] [address] [address] [address] [phone]          Patient's Medications   Start Taking    METRONIDAZOLE (FLAGYL) 500 MG TABLET    Take 1 Tab by mouth two (2) times a day for 7 days. Continue Taking    METRONIDAZOLE (FLAGYL) 500 MG TABLET    Take  by mouth three (3) times daily. PRENATAL VIT-IRON FUMARATE-FA (PRENATAL VITAMIN) 27-0.8 MG TAB TABLET    Take 1 Tab by mouth daily.    These Medications have changed    No medications on file   Stop Taking    No medications on file

## 2017-06-17 NOTE — DISCHARGE INSTRUCTIONS
Bacterial Vaginosis: Care Instructions  Your Care Instructions    Bacterial vaginosis is a type of vaginal infection. It is caused by excess growth of certain bacteria that are normally found in the vagina. Symptoms can include itching, swelling, pain when you urinate or have sex, and a gray or yellow discharge with a \"fishy\" odor. It is not considered an infection that is spread through sexual contact. Although symptoms can be annoying and uncomfortable, bacterial vaginosis does not usually cause other health problems. However, if you have it while you are pregnant, it can cause complications. While the infection may go away on its own, most doctors use antibiotics to treat it. You may have been prescribed pills or vaginal cream. With treatment, bacterial vaginosis usually clears up in 5 to 7 days. Follow-up care is a key part of your treatment and safety. Be sure to make and go to all appointments, and call your doctor if you are having problems. It's also a good idea to know your test results and keep a list of the medicines you take. How can you care for yourself at home? · Take your antibiotics as directed. Do not stop taking them just because you feel better. You need to take the full course of antibiotics. · Do not eat or drink anything that contains alcohol if you are taking metronidazole (Flagyl). · Keep using your medicine if you start your period. Use pads instead of tampons while using a vaginal cream or suppository. Tampons can absorb the medicine. · Wear loose cotton clothing. Do not wear nylon and other materials that hold body heat and moisture close to the skin. · Do not scratch. Relieve itching with a cold pack or a cool bath. · Do not wash your vaginal area more than once a day. Use plain water or a mild, unscented soap. Do not douche. When should you call for help?   Watch closely for changes in your health, and be sure to contact your doctor if:  · You have unexpected vaginal bleeding. · You have a fever. · You have new or increased pain in your vagina or pelvis. · You are not getting better after 1 week. · Your symptoms return after you finish the course of your medicine. Where can you learn more? Go to http://venecia-trudy.info/. Ty Herzog in the search box to learn more about \"Bacterial Vaginosis: Care Instructions. \"  Current as of: October 13, 2016  Content Version: 11.2  © 4765-9413 [employer]. Care instructions adapted under license by [employer] (which disclaims liability or warranty for this information). If you have questions about a medical condition or this instruction, always ask your healthcare professional. [place] [address] any warranty or liability for your use of this information. Threatened Miscarriage: Care Instructions  Your Care Instructions    Some women have light spotting or bleeding during the first 12 weeks of pregnancy. In some cases this is normal. Light spotting or bleeding can also be a sign of a possible loss of the pregnancy. This is called a threatened miscarriage. At this point, the doctor may not be able to tell if your vaginal bleeding is normal or is a sign of a miscarriage. In early pregnancy, things such as stress, exercise, and sex do not cause miscarriage. You may be worried or upset about the possibility of losing your pregnancy. But do not blame yourself. There is no treatment to stop a threatened miscarriage. If you do have a miscarriage, there was nothing you could have done to prevent it. A miscarriage usually means that the pregnancy is not developing normally. The doctor has checked you carefully, but problems can develop later. If you notice any problems or new symptoms, get medical treatment right away. Follow-up care is a key part of your treatment and safety. Be sure to make and go to all appointments, and call your doctor if you are having problems.  It's also a good idea to know your test results and keep a list of the medicines you take. How can you care for yourself at home? · If you do have a miscarriage, you will probably have some vaginal bleeding for 1 to 2 weeks. Use pads instead of tampons. · Take acetaminophen (Tylenol) for cramps. Read and follow all instructions on the label. · Do not take two or more pain medicines at the same time unless the doctor told you to. Many pain medicines have acetaminophen, which is Tylenol. Too much acetaminophen (Tylenol) can be harmful. · Do not have sex until your doctor says it is okay. · Get lots of rest over the next several days. · You may do your normal activities if you feel well enough to do them. But do not do any heavy exercise until your doctor says it is okay. · Eat a balanced diet that is high in iron and vitamin C. Foods rich in iron include red meat, shellfish, eggs, beans, and leafy green vegetables. Foods high in vitamin C include citrus fruits, tomatoes, and broccoli. Talk to your doctor about whether you need to take iron pills or a multivitamin. · Do not drink alcohol or use tobacco or illegal drugs. · Do not smoke. If you need help quitting, talk to your doctor about stop-smoking programs and medicines. These can increase your chances of quitting for good. When should you call for help? Call 911 anytime you think you may need emergency care. For example, call if:  · You have sudden, severe pain in your belly or pelvis. · You passed out (lost consciousness). · You have severe vaginal bleeding. Call your doctor now or seek immediate medical care if:  · You are dizzy or lightheaded, or you feel like you may faint. · You have new or increased pain in your belly or pelvis. · Your vaginal bleeding is getting worse. · You have increased pain in the vaginal area. · You have a fever. · You think you may have passed tissue. Save any tissue that you pass.  Take it to your doctor's office as soon as you can. Watch closely for changes in your health, and be sure to contact your doctor if:  · You have new or worse vaginal discharge. · You do not get better as expected. Where can you learn more? Go to http://venecia-trudy.info/. Enter C959 in the search box to learn more about \"Threatened Miscarriage: Care Instructions. \"  Current as of: May 30, 2016  Content Version: 11.2  © 8902-4630 [employer]. Care instructions adapted under license by [employer] (which disclaims liability or warranty for this information). If you have questions about a medical condition or this instruction, always ask your healthcare professional. [place] [address] any warranty or liability for your use of this information.

## 2017-06-17 NOTE — LETTER
NOTIFICATION OF RETURN TO WORK 
 
6/17/2017 8:58 AM 
 
Ms. Anjali [phone] [id number] Carina Alcantar To Whom It May Concern: 
 
Lizzy CanoblancaVivek was under the care of [facility]. She will be able to return to work on Monday, 06/19/17. If there are questions or concerns please have the patient contact our office. Sincerely, Iván Price PA-C

## 2017-06-19 LAB
C TRACH RRNA SPEC QL NAA+PROBE: NEGATIVE
N GONORRHOEA RRNA SPEC QL NAA+PROBE: NEGATIVE
SPECIMEN SOURCE: NORMAL

## 2017-06-20 LAB
ABO + RH BLD: NORMAL
ABSOLUTE LYMPHOCYTE COUNT, 10803: 2.5 K/UL (ref 1–4.8)
ANTIBODY SCREEN INTERPRETATION, 14017: NEGATIVE
BASOPHILS # BLD: 0 K/UL (ref 0–0.2)
BASOPHILS NFR BLD: 0 % (ref 0–2)
EOSINOPHIL # BLD: 0.1 K/UL (ref 0–0.5)
EOSINOPHIL NFR BLD: 1 % (ref 0–6)
ERYTHROCYTE [DISTWIDTH] IN BLOOD BY AUTOMATED COUNT: 14.3 % (ref 10–16)
GRANULOCYTES,GRANS: 64 % (ref 40–75)
HCT VFR BLD AUTO: 40.3 % (ref 35.1–46.5)
HEMOGLOBIN A1,HGBE1: 96.8 % (ref 96–98)
HEMOGLOBIN A2,HGBE2: 3.2 % (ref 2–4)
HEMOGLOBIN C,HGBE5: 0 % (ref 0–0)
HEMOGLOBIN D, 7336: 0 % (ref 0–0)
HEMOGLOBIN ELECTROPHORESIS INTERPRETATION, 406: NORMAL
HEMOGLOBIN F,HGBE3: 0 % (ref 0–0.1)
HEMOGLOBIN S SCREEN, 7338: NORMAL
HEMOGLOBIN S,HGBE4: 0 % (ref 0–0)
HEMOGLOBIN UNKNOWN, 7337: 0 % (ref 0–0)
HEP B SURFACE AG SCRN, 006510: NORMAL
HGB BLD-MCNC: 12.8 G/DL (ref 11.7–15.5)
HIV -1/0/2 AG/AB WITH REFLEX, 13463: NON REACTIVE
HIV 1 & 2 AB SER-IMP: NORMAL
LYMPHOCYTES, LYMLT: 27 % (ref 27–45)
MCH RBC QN AUTO: 28 PG (ref 26–34)
MCHC RBC AUTO-ENTMCNC: 32 G/DL (ref 32–36)
MCV RBC AUTO: 89 FL (ref 80–95)
MONOCYTES # BLD: 0.7 K/UL (ref 0.1–0.9)
MONOCYTES NFR BLD: 7 % (ref 3–9)
NEUTROPHILS # BLD AUTO: 5.7 K/UL (ref 1.8–7.7)
PLATELET # BLD AUTO: 331 K/UL (ref 140–440)
PLATELET FACTOR 4-ELISA ANTIBODY, 1277: NEGATIVE
PMV BLD AUTO: 10.1 FL (ref 6–10.8)
RBC # BLD AUTO: 4.53 M/UL (ref 3.8–5.2)
RUBV IGG SERPL IA-ACNC: 4 AI
SYPHILIS (T. PALLIDUM) IGG, 15809: NON REACTIVE
WBC # BLD AUTO: 9 K/UL (ref 4–11)

## 2017-06-29 ENCOUNTER — ROUTINE PRENATAL (OUTPATIENT)
Dept: OBGYN CLINIC | Age: 22
End: 2017-06-29

## 2017-06-29 VITALS
SYSTOLIC BLOOD PRESSURE: 140 MMHG | DIASTOLIC BLOOD PRESSURE: 79 MMHG | HEART RATE: 88 BPM | BODY MASS INDEX: 33.65 KG/M2 | WEIGHT: 222 LBS | HEIGHT: 68 IN

## 2017-06-29 DIAGNOSIS — Z34.02 ENCOUNTER FOR SUPERVISION OF NORMAL FIRST PREGNANCY IN SECOND TRIMESTER: Primary | ICD-10-CM

## 2017-06-29 NOTE — PROGRESS NOTES
13w4d   Presents to the office for a routine prenatal visit. No complaints  Rh +  Reviewed prenatal labs  Discussed AFP Quad after 15 wks  Counseled on weight gain in pregnancy  She verbalizes understanding of all teaching  See flow sheet  A/P: Normal prenatal visit.   F/U in 4 weeks

## 2017-06-29 NOTE — PATIENT INSTRUCTIONS

## 2017-07-15 ENCOUNTER — HOSPITAL ENCOUNTER (EMERGENCY)
Age: 22
Discharge: HOME | End: 2017-07-16
Attending: EMERGENCY MEDICINE
Payer: MEDICAID

## 2017-07-15 VITALS
HEART RATE: 77 BPM | OXYGEN SATURATION: 100 % | SYSTOLIC BLOOD PRESSURE: 103 MMHG | DIASTOLIC BLOOD PRESSURE: 81 MMHG | TEMPERATURE: 99.3 F | RESPIRATION RATE: 16 BRPM

## 2017-07-15 DIAGNOSIS — O46.92 VAGINAL BLEEDING IN PREGNANCY, SECOND TRIMESTER: ICD-10-CM

## 2017-07-15 DIAGNOSIS — N76.0 BV (BACTERIAL VAGINOSIS): Primary | ICD-10-CM

## 2017-07-15 DIAGNOSIS — B96.89 BV (BACTERIAL VAGINOSIS): Primary | ICD-10-CM

## 2017-07-15 LAB
AMORPH CRY URNS QL MICRO: ABNORMAL
APPEARANCE UR: ABNORMAL
BILIRUB UR QL: NEGATIVE
COLOR UR: YELLOW
EPITH CASTS URNS QL MICRO: ABNORMAL /LPF (ref 0–5)
GLUCOSE UR STRIP.AUTO-MCNC: NEGATIVE MG/DL
HGB UR QL STRIP: NEGATIVE
KETONES UR QL STRIP.AUTO: NEGATIVE MG/DL
LEUKOCYTE ESTERASE UR QL STRIP.AUTO: ABNORMAL
NITRITE UR QL STRIP.AUTO: NEGATIVE
PH UR STRIP: 6.5 [PH] (ref 5–8)
PROT UR STRIP-MCNC: NEGATIVE MG/DL
RBC #/AREA URNS HPF: ABNORMAL /HPF (ref 0–5)
SERVICE CMNT-IMP: NORMAL
SP GR UR REFRACTOMETRY: 1.02 (ref 1–1.03)
UROBILINOGEN UR QL STRIP.AUTO: 1 EU/DL (ref 0.2–1)
WBC URNS QL MICRO: ABNORMAL /HPF (ref 0–4)
WET PREP GENITAL: NORMAL

## 2017-07-15 PROCEDURE — 87077 CULTURE AEROBIC IDENTIFY: CPT | Performed by: PHYSICIAN ASSISTANT

## 2017-07-15 PROCEDURE — 87491 CHLMYD TRACH DNA AMP PROBE: CPT | Performed by: PHYSICIAN ASSISTANT

## 2017-07-15 PROCEDURE — 87086 URINE CULTURE/COLONY COUNT: CPT | Performed by: PHYSICIAN ASSISTANT

## 2017-07-15 PROCEDURE — 81001 URINALYSIS AUTO W/SCOPE: CPT | Performed by: PHYSICIAN ASSISTANT

## 2017-07-15 PROCEDURE — 99284 EMERGENCY DEPT VISIT MOD MDM: CPT

## 2017-07-15 PROCEDURE — 87210 SMEAR WET MOUNT SALINE/INK: CPT | Performed by: PHYSICIAN ASSISTANT

## 2017-07-15 RX ORDER — METRONIDAZOLE 500 MG/1
500 TABLET ORAL 2 TIMES DAILY
Qty: 14 TAB | Refills: 0 | Status: SHIPPED | OUTPATIENT
Start: 2017-07-15 | End: 2017-07-22

## 2017-07-16 NOTE — ED PROVIDER NOTES
HPI Comments: 25 yo  F who is 16 weeks pregnant c/o vaginal bleeding which started last night. States bleeding has slowed since onset, but still has noticed blood when she wipes. Has had first prenatal appt, Rh +. Also states she has had vag discharge c/w previous episodes of BV. Denies fever, dysuria, n/v, abdominal pain. No other complaints. Patient is a 24 y.o. female presenting with abdominal pain. Abdominal Pain    Pertinent negatives include no fever, no nausea and no vomiting. No past medical history on file. No past surgical history on file. Family History:   Problem Relation Age of Onset    No Known Problems Mother     Hypertension Father     Hypertension Paternal Grandmother     Stroke Paternal Grandmother     Hypertension Paternal Grandfather        Social History     Social History    Marital status: SINGLE     Spouse name: N/A    Number of children: N/A    Years of education: N/A     Occupational History    Not on file. Social History Main Topics    Smoking status: Former Smoker     Quit date: 2017    Smokeless tobacco: Never Used    Alcohol use No    Drug use: Yes     Special: Marijuana      Comment: smokes daily    Sexual activity: Yes     Partners: Male     Birth control/ protection: None     Other Topics Concern    Not on file     Social History Narrative         ALLERGIES: Review of patient's allergies indicates no known allergies. Review of Systems   Constitutional: Negative for fever. Gastrointestinal: Negative for abdominal pain, nausea and vomiting. Genitourinary: Positive for vaginal bleeding and vaginal discharge. All other systems reviewed and are negative. Vitals:    07/15/17 2241   BP: 103/81   Pulse: 77   Resp: 16   Temp: 99.3 °F (37.4 °C)   SpO2: 100%            Physical Exam   Constitutional: She is oriented to person, place, and time. She appears well-developed and well-nourished. No distress.    HENT:   Head: Normocephalic and atraumatic. Eyes: Conjunctivae are normal.   Neck: Normal range of motion. Neck supple. Cardiovascular: Normal rate, regular rhythm and normal heart sounds. Pulmonary/Chest: Effort normal and breath sounds normal. No respiratory distress. She has no wheezes. She has no rales. Abdominal: Soft. She exhibits no distension. There is no tenderness. There is no rebound and no guarding. Genitourinary: There is no tenderness or lesion on the right labia. There is no tenderness or lesion on the left labia. Uterus is enlarged (gravid). Uterus is not tender. Right adnexum displays no tenderness and no fullness. Left adnexum displays no tenderness and no fullness. Vaginal discharge (white) found. Musculoskeletal: Normal range of motion. Neurological: She is alert and oriented to person, place, and time. Skin: Skin is warm and dry. Psychiatric: She has a normal mood and affect. Her behavior is normal. Judgment and thought content normal.   Nursing note and vitals reviewed. MDM  Number of Diagnoses or Management Options  BV (bacterial vaginosis):   Vaginal bleeding in pregnancy, second trimester:     ED Course       Bedside US  Date/Time: 7/15/2017 11:18 PM  Consent: Verbal consent obtained.   Consent given by: patient  Supervising provider: natalia  Procedure Type: Pelvis - known pregnancy  Indication: pregnancy  Images Obtained: Transabdominal  Findings: Positive IUP with fetal heart rate  Confirmation Study: I have advised the patient to obtain a confirmatory study as an outpatient.        -------------------------------------------------------------------------------------------------------------------     EKG INTERPRETATIONS:      RADIOLOGY RESULTS:   No orders to display       LABORATORY RESULTS:  Recent Results (from the past 12 hour(s))   URINALYSIS W/ RFLX MICROSCOPIC    Collection Time: 07/15/17 10:25 PM   Result Value Ref Range    Color YELLOW      Appearance CLOUDY      Specific gravity 1.019 1.005 - 1.030      pH (UA) 6.5 5.0 - 8.0      Protein NEGATIVE  NEG mg/dL    Glucose NEGATIVE  NEG mg/dL    Ketone NEGATIVE  NEG mg/dL    Bilirubin NEGATIVE  NEG      Blood NEGATIVE  NEG      Urobilinogen 1.0 0.2 - 1.0 EU/dL    Nitrites NEGATIVE  NEG      Leukocyte Esterase SMALL (A) NEG     URINE MICROSCOPIC ONLY    Collection Time: 07/15/17 10:25 PM   Result Value Ref Range    WBC NONE 0 - 4 /hpf    RBC 3 to 5 0 - 5 /hpf    Epithelial cells 4+ 0 - 5 /lpf    Amorphous Crystals 3+ (A) NEG   WET PREP    Collection Time: 07/15/17 10:51 PM   Result Value Ref Range    Special Requests: NO SPECIAL REQUESTS      Wet prep FEW  CLUE CELLS PRESENT        Wet prep NO TRICHOMONAS SEEN      Wet prep NO YEAST SEEN             CONSULTATIONS:        PROGRESS NOTES:    11:19 PM Pt well appearing and in NAD. No blood noted on vaginal exam. Bedside US reveals IUP with fetal movement and +FHR. Wet prep +clue cells. Will treat for BV. F/u with ob/gyn as scheduled. Lengthy D/W pt regarding possible worsening of pt's condition, need for follow up and strict ED return instructions for any worsening symptoms. DISPOSITION:  ED DIAGNOSIS & DISPOSITION INFORMATION  Diagnosis:   1. BV (bacterial vaginosis)    2. Vaginal bleeding in pregnancy, second trimester          Disposition: home    Follow-up Information     Follow up With Details Comments Contact Info    Your ob/gyn  For further evaluation     [facility] EM[facility]GENCY [facility]  Immediately if symptoms worsen [address] [facility] [address]  [phone]          Patient's Medications   Start Taking    METRONIDAZOLE (FLAGYL) 500 MG TABLET    Take 1 Tab by mouth two (2) times a day for 7 days. Continue Taking    PRENATAL VIT-IRON FUMARATE-FA (PRENATAL VITAMIN) 27-0.8 MG TAB TABLET    Take 1 Tab by mouth daily. These Medications have changed    No medications on file   Stop Taking    METRONIDAZOLE (FLAGYL) 500 MG TABLET    Take  by mouth three (3) times daily.

## 2017-07-16 NOTE — DISCHARGE INSTRUCTIONS
Bacterial Vaginosis: Care Instructions  Your Care Instructions    Bacterial vaginosis is a type of vaginal infection. It is caused by excess growth of certain bacteria that are normally found in the vagina. Symptoms can include itching, swelling, pain when you urinate or have sex, and a gray or yellow discharge with a \"fishy\" odor. It is not considered an infection that is spread through sexual contact. Although symptoms can be annoying and uncomfortable, bacterial vaginosis does not usually cause other health problems. However, if you have it while you are pregnant, it can cause complications. While the infection may go away on its own, most doctors use antibiotics to treat it. You may have been prescribed pills or vaginal cream. With treatment, bacterial vaginosis usually clears up in 5 to 7 days. Follow-up care is a key part of your treatment and safety. Be sure to make and go to all appointments, and call your doctor if you are having problems. It's also a good idea to know your test results and keep a list of the medicines you take. How can you care for yourself at home? · Take your antibiotics as directed. Do not stop taking them just because you feel better. You need to take the full course of antibiotics. · Do not eat or drink anything that contains alcohol if you are taking metronidazole (Flagyl). · Keep using your medicine if you start your period. Use pads instead of tampons while using a vaginal cream or suppository. Tampons can absorb the medicine. · Wear loose cotton clothing. Do not wear nylon and other materials that hold body heat and moisture close to the skin. · Do not scratch. Relieve itching with a cold pack or a cool bath. · Do not wash your vaginal area more than once a day. Use plain water or a mild, unscented soap. Do not douche. When should you call for help?   Watch closely for changes in your health, and be sure to contact your doctor if:  · You have unexpected vaginal bleeding. · You have a fever. · You have new or increased pain in your vagina or pelvis. · You are not getting better after 1 week. · Your symptoms return after you finish the course of your medicine. Where can you learn more? Go to http://venecia-trudy.info/. Lisa Ramires in the search box to learn more about \"Bacterial Vaginosis: Care Instructions. \"  Current as of: October 13, 2016  Content Version: 11.3  © 0589-2376 [employer]. Care instructions adapted under license by [employer] (which disclaims liability or warranty for this information). If you have questions about a medical condition or this instruction, always ask your healthcare professional. [address] any warranty or liability for your use of this information.

## 2017-07-16 NOTE — ED NOTES
Patient for discharge home in no acute distress at this time will follow up with OB and the importance of prenatal care stressed to patient.

## 2017-07-16 NOTE — ED TRIAGE NOTES
Pt reports vaginal bleeding some yesterday and today along with pain. Pt states that she is 16 weeks pregnant.

## 2017-07-17 LAB
BACTERIA SPEC CULT: ABNORMAL
BACTERIA SPEC CULT: ABNORMAL
C TRACH RRNA SPEC QL NAA+PROBE: NEGATIVE
N GONORRHOEA RRNA SPEC QL NAA+PROBE: NEGATIVE
SERVICE CMNT-IMP: ABNORMAL
SPECIMEN SOURCE: NORMAL

## 2017-07-26 ENCOUNTER — ROUTINE PRENATAL (OUTPATIENT)
Dept: OBGYN CLINIC | Age: 22
End: 2017-07-26

## 2017-07-26 VITALS
BODY MASS INDEX: 34.16 KG/M2 | HEART RATE: 72 BPM | WEIGHT: 225.38 LBS | HEIGHT: 68 IN | DIASTOLIC BLOOD PRESSURE: 78 MMHG | SYSTOLIC BLOOD PRESSURE: 124 MMHG

## 2017-07-26 DIAGNOSIS — Z34.82 ENCOUNTER FOR SUPERVISION OF OTHER NORMAL PREGNANCY, SECOND TRIMESTER: ICD-10-CM

## 2017-07-26 DIAGNOSIS — Z34.82 ENCOUNTER FOR SUPERVISION OF OTHER NORMAL PREGNANCY, SECOND TRIMESTER: Primary | ICD-10-CM

## 2017-07-26 NOTE — PATIENT INSTRUCTIONS

## 2017-08-01 LAB
AFP INTERPRETATION,AFP17: NORMAL
AFP VALUE, 017262: 34.9 NG/ML
ALPHA FETOPROTEIN,XALFE: NORMAL
DIA MOM VALUE, 10892: 2.92
DIA VALUE, 017280: 403.57 PG/ML
DSR (BY AGE) 1 IN, 017415: 1128
DSR (SECOND TRIMESTER) 1 IN, 017414: 3168
GESTATIONAL AGE BASED ON, 017963: NORMAL
GESTATIONAL AGE, 017275: 17 WEEKS
HCG VALUE, 017377: NORMAL MIU/ML
INSULIN DEP DIABETES, 017896: NO
INTEGRATED SCREEN SECOND TRIMESTER,150315: NORMAL
Lab: NORMAL
MATERNAL AGE, 1088: 22 YEARS
MOM AFP, 7036: 1.08
MOM BHCG, 10689: 0.52
MULTIPLE GESTATION, 017995: NO
OSBR RISK 1 IN, 017413: NORMAL
RACE AFP, 10764: NORMAL
T18 (BY AGE), 017419: NORMAL
T18 RISK, 017392: NORMAL
TEST RESULTS, 10877: NORMAL
UE3 MOM, 017308: 1.17
UE3 VALUE, 017268: 1.12 NG/ML
WEIGHT, 017276: 225 LBS

## 2017-08-09 ENCOUNTER — HOSPITAL ENCOUNTER (OUTPATIENT)
Dept: ULTRASOUND IMAGING | Age: 22
Discharge: HOME | End: 2017-08-09
Attending: ADVANCED PRACTICE MIDWIFE
Payer: MEDICAID

## 2017-08-09 DIAGNOSIS — Z34.82 ENCOUNTER FOR SUPERVISION OF OTHER NORMAL PREGNANCY, SECOND TRIMESTER: ICD-10-CM

## 2017-08-09 PROCEDURE — 76805 OB US >/= 14 WKS SNGL FETUS: CPT

## 2017-08-23 ENCOUNTER — ROUTINE PRENATAL (OUTPATIENT)
Dept: OBGYN CLINIC | Age: 22
End: 2017-08-23

## 2017-08-23 VITALS
WEIGHT: 228 LBS | BODY MASS INDEX: 34.56 KG/M2 | DIASTOLIC BLOOD PRESSURE: 82 MMHG | RESPIRATION RATE: 18 BRPM | SYSTOLIC BLOOD PRESSURE: 126 MMHG | HEIGHT: 68 IN | HEART RATE: 86 BPM

## 2017-08-23 DIAGNOSIS — N76.0 ACUTE VAGINITIS: ICD-10-CM

## 2017-08-23 DIAGNOSIS — Z34.02 ENCOUNTER FOR SUPERVISION OF NORMAL FIRST PREGNANCY IN SECOND TRIMESTER: Primary | ICD-10-CM

## 2017-08-23 NOTE — PROGRESS NOTES
@ 21w 3d  C/o white-yellow vaginal discharge w/itching, odor and burning/irritation  Has 1 partner  nuswab --> collected  Has not tried anything to resolve --> results pending  AFP Quad screen neg  Normal morph US report  Discussed weight gain in pregnancy  See flow sheet  A/P: Routine OB Visit  RTC in 4 wks

## 2017-08-23 NOTE — PATIENT INSTRUCTIONS

## 2017-08-25 LAB
BACTERIAL VAGINOSIS, NAA: POSITIVE
CANDIDA ALBICANS, NAA, 180056: POSITIVE
CANDIDA GLABRATA, NAA, 180057: NEGATIVE
CANDIDA KRUSEI, NAA, 180016: NEGATIVE
CHLAMYDIA TRACHOMATIS, NAA, 180097: NEGATIVE
NEISSERIA GONORRHOEAE, NAA, 180104: NEGATIVE
TRICH VAG BY NAA, 180087: NEGATIVE

## 2017-08-29 ENCOUNTER — TELEPHONE (OUTPATIENT)
Dept: OBGYN CLINIC | Age: 22
End: 2017-08-29

## 2017-08-29 DIAGNOSIS — B37.31 VAGINAL YEAST INFECTION: ICD-10-CM

## 2017-08-29 DIAGNOSIS — B96.89 BACTERIAL VAGINOSIS: Primary | ICD-10-CM

## 2017-08-29 DIAGNOSIS — N76.0 BACTERIAL VAGINOSIS: Primary | ICD-10-CM

## 2017-08-29 RX ORDER — TERCONAZOLE 8 MG/G
1 CREAM VAGINAL
Qty: 20 G | Refills: 0 | Status: SHIPPED | OUTPATIENT
Start: 2017-08-29 | End: 2017-09-01

## 2017-08-29 RX ORDER — METRONIDAZOLE 7.5 MG/G
1 GEL VAGINAL
Qty: 187.5 MG | Refills: 0 | Status: SHIPPED | OUTPATIENT
Start: 2017-08-29 | End: 2017-09-03

## 2017-08-29 NOTE — TELEPHONE ENCOUNTER
Pt is aware of lab results and stated that she'll be picking up medication today. Pt verified understanding and had no questions/concerns.

## 2017-08-29 NOTE — TELEPHONE ENCOUNTER
----- Message from Love Boudreaux CNM sent at 8/29/2017  3:02 PM EDT -----  Please notify patient of BV and vaginal yeast infection. Medication has been escribed to pharmacy.

## 2017-09-21 ENCOUNTER — ROUTINE PRENATAL (OUTPATIENT)
Dept: OBGYN CLINIC | Age: 22
End: 2017-09-21

## 2017-09-21 VITALS
BODY MASS INDEX: 35.05 KG/M2 | WEIGHT: 231.25 LBS | HEART RATE: 95 BPM | SYSTOLIC BLOOD PRESSURE: 113 MMHG | HEIGHT: 68 IN | RESPIRATION RATE: 18 BRPM | TEMPERATURE: 98.1 F | DIASTOLIC BLOOD PRESSURE: 68 MMHG

## 2017-09-21 DIAGNOSIS — Z71.89 ENCOUNTER FOR BREAST FEEDING COUNSELING: ICD-10-CM

## 2017-09-21 DIAGNOSIS — Z34.82 ENCOUNTER FOR SUPERVISION OF OTHER NORMAL PREGNANCY IN SECOND TRIMESTER: Primary | ICD-10-CM

## 2017-09-21 NOTE — PATIENT INSTRUCTIONS
Weeks 22 to 26 of Your Pregnancy: Care Instructions  Your Care Instructions    As you enter your 7th month of pregnancy at week 26, your baby's lungs are growing stronger and getting ready to breathe. You may notice that your baby responds to the sound of your or your partner's voice. You may also notice that your baby does less turning and twisting and more squirming or jerking. Jerking often means that your baby has the hiccups. Hiccups are perfectly normal and are only temporary. You may want to think about attending a childbirth preparation class. This is also a good time to start thinking about whether you want to have pain medicine during labor. Most pregnant women are tested for gestational diabetes between weeks 25 and 28. Gestational diabetes occurs when your blood sugar level gets too high when you're pregnant. The test is important, because you can have gestational diabetes and not know it. But the condition can cause problems for your baby. Follow-up care is a key part of your treatment and safety. Be sure to make and go to all appointments, and call your doctor if you are having problems. It's also a good idea to know your test results and keep a list of the medicines you take. How can you care for yourself at home? Ease discomfort from your baby's kicking  · Change your position. Sometimes this will cause your baby to change position too. · Take a deep breath while you raise your arm over your head. Then breathe out while you drop your arm. Do Kegel exercises to prevent urine from leaking  · You can do Kegel exercises while you stand or sit. ¨ Squeeze the same muscles you would use to stop your urine. Your belly and thighs should not move. ¨ Hold the squeeze for 3 seconds, and then relax for 3 seconds. ¨ Start with 3 seconds. Then add 1 second each week until you are able to squeeze for 10 seconds. ¨ Repeat the exercise 10 to 15 times for each session.  Do three or more sessions each day.  Ease or reduce swelling in your feet, ankles, hands, and fingers  · If your fingers are puffy, take off your rings. · Do not eat high-salt foods, such as potato chips. · Prop up your feet on a stool or couch as much as possible. Sleep with pillows under your feet. · Do not stand for long periods of time or wear tight shoes. · Wear support stockings. Where can you learn more? Go to http://venecia-trudy.info/. Enter G264 in the search box to learn more about \"Weeks 22 to 26 of Your Pregnancy: Care Instructions. \"  Current as of: March 16, 2017  Content Version: 11.3  © 6606-0857 [employer]. Care instructions adapted under license by [employer] (which disclaims liability or warranty for this information). If you have questions about a medical condition or this instruction, always ask your healthcare professional. [place] [address] any warranty or liability for your use of this information.

## 2017-09-21 NOTE — PROGRESS NOTES
@ 25w 4d  No OB complaints today  Vaginal discharge --> resolved  Having some round ligament pain when walking  Discussed maternity band  Counseled on glucose tolerance test  Discussed weight in pregnancy  Desires to breast feed --> rx written for breast pump  Discussed PTL precautions  She verbalizes understanding of all teaching  See flow sheet  A/P: Routine OB Visit  RTC in 3 wks - 1 hr GTT next visit

## 2017-10-12 ENCOUNTER — OFFICE VISIT (OUTPATIENT)
Dept: OBGYN CLINIC | Age: 22
End: 2017-10-12

## 2017-10-12 VITALS
HEART RATE: 88 BPM | BODY MASS INDEX: 35.01 KG/M2 | WEIGHT: 231 LBS | HEIGHT: 68 IN | DIASTOLIC BLOOD PRESSURE: 77 MMHG | SYSTOLIC BLOOD PRESSURE: 122 MMHG

## 2017-10-12 DIAGNOSIS — Z34.92 PRENATAL CARE IN SECOND TRIMESTER: Primary | ICD-10-CM

## 2017-10-12 DIAGNOSIS — Z3A.28 28 WEEKS GESTATION OF PREGNANCY: ICD-10-CM

## 2017-10-12 LAB
ERYTHROCYTE [DISTWIDTH] IN BLOOD BY AUTOMATED COUNT: 14.1 % (ref 10–16)
GESTATIONAL DIABETES SCREEN, 102285: 111 MCG/DL (ref 65–139)
HCT VFR BLD AUTO: 32.8 % (ref 35.1–46.5)
HGB BLD-MCNC: 10.9 G/DL (ref 11.7–15.5)
MCH RBC QN AUTO: 29 PG (ref 26–34)
MCHC RBC AUTO-ENTMCNC: 33 G/DL (ref 32–36)
MCV RBC AUTO: 87 FL (ref 80–95)
PLATELET # BLD AUTO: 265 K/UL (ref 140–440)
PMV BLD AUTO: 11.1 FL (ref 6–10.8)
RBC # BLD AUTO: 3.77 M/UL (ref 3.8–5.2)
WBC # BLD AUTO: 11.4 K/UL (ref 4–11)

## 2017-10-16 DIAGNOSIS — D50.9 IRON DEFICIENCY ANEMIA DURING PREGNANCY, ANTEPARTUM: Primary | ICD-10-CM

## 2017-10-16 DIAGNOSIS — O99.019 IRON DEFICIENCY ANEMIA DURING PREGNANCY, ANTEPARTUM: Primary | ICD-10-CM

## 2017-10-16 RX ORDER — FERROUS FUMARATE 324(106)MG
1 TABLET ORAL 2 TIMES DAILY
Qty: 60 TAB | Refills: 5 | Status: ON HOLD | OUTPATIENT
Start: 2017-10-16 | End: 2017-12-30

## 2017-10-16 NOTE — PROGRESS NOTES
Normal 1 hr GTT. Please notify pt of iron deficiency anemia in pregnancy. Rx has been escribed to pharmacy. Thanks!

## 2017-10-23 ENCOUNTER — ROUTINE PRENATAL (OUTPATIENT)
Dept: OBGYN CLINIC | Age: 22
End: 2017-10-23

## 2017-10-23 VITALS
HEIGHT: 68 IN | DIASTOLIC BLOOD PRESSURE: 82 MMHG | BODY MASS INDEX: 35.68 KG/M2 | WEIGHT: 235.4 LBS | SYSTOLIC BLOOD PRESSURE: 124 MMHG | HEART RATE: 105 BPM

## 2017-10-23 DIAGNOSIS — Z34.83 ENCOUNTER FOR SUPERVISION OF OTHER NORMAL PREGNANCY IN THIRD TRIMESTER: Primary | ICD-10-CM

## 2017-10-23 DIAGNOSIS — B37.31 VAGINAL YEAST INFECTION: ICD-10-CM

## 2017-10-23 RX ORDER — TERCONAZOLE 8 MG/G
1 CREAM VAGINAL
Qty: 20 G | Refills: 0 | Status: SHIPPED | OUTPATIENT
Start: 2017-10-23 | End: 2017-11-15 | Stop reason: SDUPTHER

## 2017-10-23 NOTE — PROGRESS NOTES
@ 30w 4d  C/o white vaginal discharge, cottage cheese, odor  Microscopic wet-mount exam shows hyphae.   Mild H/A --> off/on, relieved with Tylenol  Taking iron supplements  Normal 1 hr GTT  Reviewed weight gain in pregnancy  She verbalizes understanding of all teaching  See flow sheet  A/P: Routine OB Visit  RTC in 2 wks

## 2017-10-23 NOTE — PATIENT INSTRUCTIONS
Weeks 30 to 32 of Your Pregnancy: Care Instructions  Your Care Instructions    You have made it to the final months of your pregnancy. By now, your baby is really starting to look like a baby, with hair and plump skin. As you enter the final weeks of pregnancy, the reality of having a baby may start to set in. This is the time to settle on a name, get your household in order, set up a safe nursery, and find quality  if needed. Doing these things in advance will allow you to focus on caring for and enjoying your new baby. You may also want to have a tour of your hospital's labor and delivery unit to get a better idea of what to expect while you are in the hospital.  During these last months, it is very important to take good care of yourself and pay attention to what your body needs. If your doctor says it is okay for you to work, don't push yourself too hard. Use the tips provided in this care sheet to ease heartburn and care for varicose veins. If you haven't already had the Tdap shot during this pregnancy, talk to your doctor about getting it. It will help protect your  against pertussis infection. Follow-up care is a key part of your treatment and safety. Be sure to make and go to all appointments, and call your doctor if you are having problems. It's also a good idea to know your test results and keep a list of the medicines you take. How can you care for yourself at home? Pay attention to your baby's movements  · You should feel your baby move several times every day. · Your baby now turns less, and kicks and jabs more. · Your baby sleeps 20 to 45 minutes at a time and is more active at certain times of day. · If your doctor wants you to count your baby's kicks:  ¨ Empty your bladder, and lie on your side or relax in a comfortable chair. ¨ Write down your start time. ¨ Pay attention only to your baby's movements. Count any movement except hiccups.   ¨ After you have counted 10 movements, write down your stop time. ¨ Write down how many minutes it took for your baby to move 10 times. ¨ If an hour goes by and you have not recorded 10 movements, have something to eat or drink and then count for another hour. If you do not record 10 movements in either hour, call your doctor. Ease heartburn  · Eat small, frequent meals. · Do not eat chocolate, peppermint, or very spicy foods. Avoid drinks with caffeine, such as coffee, tea, and sodas. · Avoid bending over or lying down after meals. · Talk a short walk after you eat. · If heartburn is a problem at night, do not eat for 2 hours before bedtime. · Take antacids like Mylanta, Maalox, Rolaids, or Tums. Do not take antacids that have sodium bicarbonate. Care for varicose veins  · Varicose veins are blood vessels that stretch out with the extra blood during pregnancy. Your legs may ache or throb. Most varicose veins will go away after the birth. · Avoid standing for long periods of time. Sit with your legs crossed at the ankles, not the knees. · Sit with your feet propped up. · Avoid tight clothing or stockings. Wear support hose. · Exercise regularly. Try walking for at least 30 minutes a day. Where can you learn more? Go to http://venecia-trudy.info/. Enter L590 in the search box to learn more about \"Weeks 30 to 32 of Your Pregnancy: Care Instructions. \"  Current as of: March 16, 2017  Content Version: 11.3  © 5753-9765 [employer]. Care instructions adapted under license by [employer] (which disclaims liability or warranty for this information). If you have questions about a medical condition or this instruction, always ask your healthcare professional. [place] [address] any warranty or liability for your use of this information.

## 2017-11-15 ENCOUNTER — ROUTINE PRENATAL (OUTPATIENT)
Dept: OBGYN CLINIC | Age: 22
End: 2017-11-15

## 2017-11-15 VITALS
WEIGHT: 236 LBS | HEIGHT: 68 IN | HEART RATE: 81 BPM | BODY MASS INDEX: 35.77 KG/M2 | DIASTOLIC BLOOD PRESSURE: 78 MMHG | SYSTOLIC BLOOD PRESSURE: 132 MMHG

## 2017-11-15 DIAGNOSIS — B37.31 VAGINAL YEAST INFECTION: ICD-10-CM

## 2017-11-15 DIAGNOSIS — Z34.83 ENCOUNTER FOR SUPERVISION OF OTHER NORMAL PREGNANCY, THIRD TRIMESTER: Primary | ICD-10-CM

## 2017-11-15 RX ORDER — TERCONAZOLE 8 MG/G
1 CREAM VAGINAL
Qty: 20 G | Refills: 0 | Status: SHIPPED | OUTPATIENT
Start: 2017-11-15 | End: 2017-11-18

## 2017-11-16 NOTE — PROGRESS NOTES
33w 6d   Presents to the office for a routine prenatal visit. Persistent vaginal itching, desires refill of Terazol  No other OB complaints  Discussed flu vaccine and Tdap  Discussed breast vs bottle feeding  Counseled on weight gain in pregnancy  She verbalizes understanding of all teaching  See flow sheet  A/P: Normal prenatal visit.   F/U in 2 weeks

## 2017-12-07 ENCOUNTER — ROUTINE PRENATAL (OUTPATIENT)
Dept: OBGYN CLINIC | Age: 22
End: 2017-12-07

## 2017-12-07 VITALS
SYSTOLIC BLOOD PRESSURE: 135 MMHG | BODY MASS INDEX: 38.25 KG/M2 | HEIGHT: 66 IN | HEART RATE: 88 BPM | DIASTOLIC BLOOD PRESSURE: 95 MMHG | WEIGHT: 238 LBS

## 2017-12-07 DIAGNOSIS — Z34.93 PRENATAL CARE IN THIRD TRIMESTER: Primary | ICD-10-CM

## 2017-12-07 NOTE — PATIENT INSTRUCTIONS
Week 37 of Your Pregnancy: Care Instructions  Your Care Instructions    You are near the end of your pregnancy-and you're probably pretty uncomfortable. It may be harder to walk around. Lying down probably isn't comfortable either. You may have trouble getting to sleep or staying asleep. Most women deliver their babies between 40 and 41 weeks. This is a good time to think about packing a bag for the hospital with items you'll need. Then you'll be ready when labor starts. Follow-up care is a key part of your treatment and safety. Be sure to make and go to all appointments, and call your doctor if you are having problems. It's also a good idea to know your test results and keep a list of the medicines you take. How can you care for yourself at home? Learn about breastfeeding  · Breastfeeding is best for your baby and good for you. · Breast milk has antibodies to help your baby fight infections. · Mothers who breastfeed often lose weight faster, because making milk burns calories. · Learning the best ways to hold your baby will make breastfeeding easier. · Let your partner bathe and diaper the baby to keep your partner from feeling left out. Snuggle together when you breastfeed. · You may want to learn how to use a breast pump and store your milk. · If you choose to bottle feed, make the feeding feel like breastfeeding so you can bond with your baby. Always hold your baby and the bottle. Do not prop bottles or let your baby fall asleep with a bottle. Learn about crying  · It is common for babies to cry for 1 to 3 hours a day. Some cry more, some cry less. · Babies don't cry to make you upset or because you are a bad parent. · Crying is how your baby communicates. Your baby may be hungry; have gas; need a diaper change; or feel cold, warm, tired, lonely, or tense. Sometimes babies cry for unknown reasons. · If you respond to your baby's needs, he or she will learn to trust you.   · Try to stay calm when your baby cries. Your baby may get more upset if he or she senses that you are upset. Know how to care for your   · Your baby's umbilical cord stump will drop off on its own, usually between 1 and 2 weeks. To care for your baby's umbilical cord area:  ¨ Clean the area at the bottom of the cord 2 or 3 times a day. ¨ Pay special attention to the area where the cord attaches to the skin. ¨ Keep the diaper folded below the cord. ¨ Use a damp washcloth or cotton ball to sponge bathe your baby until the stump has come off. · Your baby's first dark stool is called meconium. After the meconium is passed, your baby will develop his or her own bowel pattern. ¨ Some babies, especially  babies, have several bowel movements a day. Others have one or two a day, or one every 2 to 3 days. ¨  babies often have loose, yellow stools. Formula-fed babies have more formed stools. ¨ If your baby's stools look like little pellets, he or she is constipated. After 2 days of constipation, call your baby's doctor. · If your baby will be circumcised, you can care for him at home. ¨ Gently rinse his penis with warm water after every diaper change. Do not try to remove the film that forms on the penis. This film will go away on its own. Pat dry. ¨ Put petroleum ointment, such as Vaseline, on the area of the diaper that will touch your baby's penis. This will keep the diaper from sticking to your baby. ¨ Ask the doctor about giving your baby acetaminophen (Tylenol) for pain. Where can you learn more? Go to http://venecia-trudy.info/. Enter 68 21 97 in the search box to learn more about \"Week 37 of Your Pregnancy: Care Instructions. \"  Current as of: 2017  Content Version: 11.4  © 1065-4792 [employer]. Care instructions adapted under license by [employer] (which disclaims liability or warranty for this information).  If you have questions about a medical condition or this instruction, always ask your healthcare professional. [place] [address] any warranty or liability for your use of this information.

## 2017-12-07 NOTE — PROGRESS NOTES
37w0d     No OB complaints of VB or LOF  No H/A blurred vision or epigastric pain  Off/on ctx \"not consistent\"  Rh +  GBS/CT/GC/trich --> collected  Counseled on labor precautions  Counseled on weight gain in pregnancy  She verbalizes understanding of all teaching  See flow sheet  A/P: Normal prenatal visit.   F/U in 1 weeks

## 2017-12-13 ENCOUNTER — ROUTINE PRENATAL (OUTPATIENT)
Dept: OBGYN CLINIC | Age: 22
End: 2017-12-13

## 2017-12-13 VITALS
HEART RATE: 72 BPM | WEIGHT: 240 LBS | SYSTOLIC BLOOD PRESSURE: 132 MMHG | BODY MASS INDEX: 38.74 KG/M2 | DIASTOLIC BLOOD PRESSURE: 84 MMHG

## 2017-12-13 DIAGNOSIS — Z3A.37 37 WEEKS GESTATION OF PREGNANCY: ICD-10-CM

## 2017-12-13 DIAGNOSIS — B00.9 HSV-2 INFECTION COMPLICATING PREGNANCY, THIRD TRIMESTER: ICD-10-CM

## 2017-12-13 DIAGNOSIS — O98.513 HSV-2 INFECTION COMPLICATING PREGNANCY, THIRD TRIMESTER: ICD-10-CM

## 2017-12-13 DIAGNOSIS — Z34.83 ENCOUNTER FOR SUPERVISION OF OTHER NORMAL PREGNANCY, THIRD TRIMESTER: Primary | ICD-10-CM

## 2017-12-13 LAB
BACTERIAL VAGINOSIS, NAA: NEGATIVE
CANDIDA ALBICANS, NAA, 180056: NEGATIVE
CANDIDA GLABRATA, NAA, 180057: NEGATIVE
CANDIDA KRUSEI, NAA, 180016: NEGATIVE
CHLAMYDIA TRACHOMATIS, NAA, 180097: NEGATIVE
CULTURE RESULT, SENTARA: NORMAL
HERPES 1 (THINPREP / APTIMA SWAB): NEGATIVE
HERPES 2 (THINPREP / APTIMA SWAB): POSITIVE
NEISSERIA GONORRHOEAE, NAA, 180104: NEGATIVE
TRICH VAG BY NAA, 180087: NEGATIVE

## 2017-12-13 RX ORDER — VALACYCLOVIR HYDROCHLORIDE 1 G/1
1000 TABLET, FILM COATED ORAL DAILY
Qty: 30 TAB | Refills: 0 | Status: SHIPPED | OUTPATIENT
Start: 2017-12-13 | End: 2017-12-30

## 2017-12-13 NOTE — PROGRESS NOTES
37.6 Weeks  Dated by LMP c/w 9 week US  See flow sheet  Tdap vaccine not done, reviewed need  Reviewed labs and imaging  GBS/GC/Chl/TV recollected as not resulted from last time  R/B/A of induction with vaginal delivery or C/S   discussed including infection, bleeding, blood transfusion,   injury to internal organs, baby, operative vaginal delivery   and shoulder dystocia with increased morbidity and mortality. All of her questions answered.   IOL set for 12/28  Flu vaccine not done, reviewed need  RTC 1 weeks

## 2017-12-13 NOTE — PATIENT INSTRUCTIONS
Labor Induction: Care Instructions  Your Care Instructions  If you pass your due date and your labor does not start on its own, your doctor may want to try to start (induce) labor. Your doctor may suggest doing this for other reasons. It may be a good idea to induce labor if you have another problem. For example, it may be done if you have high blood pressure. Or it may be a good idea if the placenta can no longer give enough support to the baby. There are several ways to induce labor. · Medicine may be used to make the cervix soft and help it thin. · Medicine may be used to cause the uterus to contract. · A balloon catheter may be used to help the cervix open. · Your doctor may sweep the membranes or break the amniotic sac to start or increase labor. This may be done if your cervix is soft and slightly open. You may need to have your baby delivered through a cut (incision) in your belly. This is called a  section, or a . It may be done if your doctor has used medicine but your labor is not progressing. After you have your baby, you should not have any side effects from the medicine used to start labor. Follow-up care is a key part of your treatment and safety. Be sure to make and go to all appointments, and call your doctor if you are having problems. It's also a good idea to know your test results and keep a list of the medicines you take. When should your labor be induced? · Your pregnancy has gone 1 to 2 weeks past your expected due date. · You have a problem that may harm your health or the health of your baby if you continue to be pregnant. This includes high blood pressure, preeclampsia, and diabetes. · Your water breaks, but labor does not start. When should you call for help? Watch closely for changes in your health, and be sure to contact your doctor if you have questions about inducing labor. Where can you learn more?   Go to http://venecia-trudy.info/. Enter O327 in the search box to learn more about \"Labor Induction: Care Instructions. \"  Current as of: March 16, 2017  Content Version: 11.4  © 4422-4038 [employer]. Care instructions adapted under license by [employer] (which disclaims liability or warranty for this information). If you have questions about a medical condition or this instruction, always ask your healthcare professional. [place] [address] any warranty or liability for your use of this information. Week 37 of Your Pregnancy: Care Instructions  Your Care Instructions    You are near the end of your pregnancy-and you're probably pretty uncomfortable. It may be harder to walk around. Lying down probably isn't comfortable either. You may have trouble getting to sleep or staying asleep. Most women deliver their babies between 40 and 41 weeks. This is a good time to think about packing a bag for the hospital with items you'll need. Then you'll be ready when labor starts. Follow-up care is a key part of your treatment and safety. Be sure to make and go to all appointments, and call your doctor if you are having problems. It's also a good idea to know your test results and keep a list of the medicines you take. How can you care for yourself at home? Learn about breastfeeding  · Breastfeeding is best for your baby and good for you. · Breast milk has antibodies to help your baby fight infections. · Mothers who breastfeed often lose weight faster, because making milk burns calories. · Learning the best ways to hold your baby will make breastfeeding easier. · Let your partner bathe and diaper the baby to keep your partner from feeling left out. Snuggle together when you breastfeed. · You may want to learn how to use a breast pump and store your milk. · If you choose to bottle feed, make the feeding feel like breastfeeding so you can bond with your baby. Always hold your baby and the bottle. Do not prop bottles or let your baby fall asleep with a bottle. Learn about crying  · It is common for babies to cry for 1 to 3 hours a day. Some cry more, some cry less. · Babies don't cry to make you upset or because you are a bad parent. · Crying is how your baby communicates. Your baby may be hungry; have gas; need a diaper change; or feel cold, warm, tired, lonely, or tense. Sometimes babies cry for unknown reasons. · If you respond to your baby's needs, he or she will learn to trust you. · Try to stay calm when your baby cries. Your baby may get more upset if he or she senses that you are upset. Know how to care for your   · Your baby's umbilical cord stump will drop off on its own, usually between 1 and 2 weeks. To care for your baby's umbilical cord area:  ¨ Clean the area at the bottom of the cord 2 or 3 times a day. ¨ Pay special attention to the area where the cord attaches to the skin. ¨ Keep the diaper folded below the cord. ¨ Use a damp washcloth or cotton ball to sponge bathe your baby until the stump has come off. · Your baby's first dark stool is called meconium. After the meconium is passed, your baby will develop his or her own bowel pattern. ¨ Some babies, especially  babies, have several bowel movements a day. Others have one or two a day, or one every 2 to 3 days. ¨  babies often have loose, yellow stools. Formula-fed babies have more formed stools. ¨ If your baby's stools look like little pellets, he or she is constipated. After 2 days of constipation, call your baby's doctor. · If your baby will be circumcised, you can care for him at home. ¨ Gently rinse his penis with warm water after every diaper change. Do not try to remove the film that forms on the penis. This film will go away on its own. Pat dry. ¨ Put petroleum ointment, such as Vaseline, on the area of the diaper that will touch your baby's penis.  This will keep the diaper from sticking to your baby. ¨ Ask the doctor about giving your baby acetaminophen (Tylenol) for pain. Where can you learn more? Go to http://venecia-trudy.info/. Enter 68 21 97 in the search box to learn more about \"Week 37 of Your Pregnancy: Care Instructions. \"  Current as of: March 16, 2017  Content Version: 11.4  © 8014-5234 [employer]. Care instructions adapted under license by [employer] (which disclaims liability or warranty for this information). If you have questions about a medical condition or this instruction, always ask your healthcare professional. [place] [address] any warranty or liability for your use of this information.

## 2017-12-14 ENCOUNTER — TELEPHONE (OUTPATIENT)
Dept: OBGYN CLINIC | Age: 22
End: 2017-12-14

## 2017-12-14 NOTE — TELEPHONE ENCOUNTER
Received a call from Butch Crawford stating pt was returning Dr. Suad Hackett call but there was no indication as to what the call was in regards to.

## 2017-12-21 ENCOUNTER — ROUTINE PRENATAL (OUTPATIENT)
Dept: OBGYN CLINIC | Age: 22
End: 2017-12-21

## 2017-12-21 VITALS
HEIGHT: 66 IN | BODY MASS INDEX: 37.96 KG/M2 | OXYGEN SATURATION: 100 % | HEART RATE: 98 BPM | DIASTOLIC BLOOD PRESSURE: 69 MMHG | WEIGHT: 236.2 LBS | SYSTOLIC BLOOD PRESSURE: 111 MMHG | TEMPERATURE: 97.1 F | RESPIRATION RATE: 16 BRPM

## 2017-12-21 DIAGNOSIS — O98.513 HERPES SIMPLEX VIRUS TYPE 2 (HSV-2) INFECTION AFFECTING PREGNANCY IN THIRD TRIMESTER: ICD-10-CM

## 2017-12-21 DIAGNOSIS — R30.0 DYSURIA: ICD-10-CM

## 2017-12-21 DIAGNOSIS — B00.9 HERPES SIMPLEX VIRUS TYPE 2 (HSV-2) INFECTION AFFECTING PREGNANCY IN THIRD TRIMESTER: ICD-10-CM

## 2017-12-21 DIAGNOSIS — Z71.89 ENCOUNTER FOR BREAST FEEDING COUNSELING: ICD-10-CM

## 2017-12-21 DIAGNOSIS — Z34.93 PRENATAL CARE, THIRD TRIMESTER: Primary | ICD-10-CM

## 2017-12-21 LAB
BILIRUB UR QL STRIP: NEGATIVE
GLUCOSE UR-MCNC: NEGATIVE MG/DL
KETONES P FAST UR STRIP-MCNC: NEGATIVE MG/DL
PH UR STRIP: 7 [PH] (ref 4.6–8)
PROT UR QL STRIP: NORMAL
SP GR UR STRIP: 1.01 (ref 1–1.03)
UA UROBILINOGEN AMB POC: NORMAL (ref 0.2–1)
URINALYSIS CLARITY POC: NORMAL
URINALYSIS COLOR POC: NORMAL
URINE BLOOD POC: NORMAL
URINE LEUKOCYTES POC: NORMAL
URINE NITRITES POC: NEGATIVE

## 2017-12-21 NOTE — PROGRESS NOTES
39w0d   Presents to the office for a routine prenatal visit. 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   F/U in 2 weeks postpartum

## 2017-12-21 NOTE — PROGRESS NOTES
Chief Complaint   Patient presents with    Routine Prenatal Visit     38wk (Rm #8)    Pelvic Pain     pt c/o intermittent \"stabbing\" pain in pelvic area, pain level 4/10

## 2017-12-21 NOTE — PATIENT INSTRUCTIONS
Week 39 of Your Pregnancy: Care Instructions  Your Care Instructions    During these final weeks, you may feel anxious to see your new baby. [place] babies often look different from what you see in pictures or movies. Right after birth, their heads may have a strange shape. Their eyes may be puffy. And their genitals may be swollen. They may also have very dry skin, or red marks on the eyelids, nose, or neck. Still, most parents think their babies are beautiful. Follow-up care is a key part of your treatment and safety. Be sure to make and go to all appointments, and call your doctor if you are having problems. It's also a good idea to know your test results and keep a list of the medicines you take. How can you care for yourself at home? Prepare to breastfeed  · If you are breastfeeding, continue to eat healthy foods. · Avoid alcohol, cigarettes, and drugs. This includes prescription and over-the-counter medicines. · You can help prevent sore nipples if you feed your baby in the correct position. Nurses will help you learn to do this. · Your  will need to be fed about every 1½ to 3 hours. Choose the right birth control after your baby is born  · Women who are breastfeeding can still get pregnant. Use birth control if you don't want to get pregnant. · Intrauterine devices (IUDs) work for women who want to wait at least 2 years before getting pregnant again. They are safe to use while you are breastfeeding. · Depo-Provera can be used while you are breastfeeding. It is a shot you get every 3 months. · Birth control pills work well. But you need a different kind of pill while you are breastfeeding. And when you start taking these pills, you need to make sure to use another type of birth control until you start your second pack. · Diaphragms, cervical caps, tubal implants, and condoms with spermicide work less well after birth.  If you have a diaphragm or cervical cap, you will need to have it refitted. · Tubal ligation (tying your tubes) and vasectomy are both permanent. These are good options if you are sure you are done having children. Where can you learn more? Go to http://venecia-trudy.info/. Enter G555 in the search box to learn more about \"Week 39 of Your Pregnancy: Care Instructions. \"  Current as of: March 16, 2017  Content Version: 11.4  © 4744-9733 [employer] Care instructions adapted under license by [employer] (which disclaims liability or warranty for this information). If you have questions about a medical condition or this instruction, always ask your healthcare professional. [address] any warranty or liability for your use of this information.

## 2017-12-27 ENCOUNTER — HOSPITAL ENCOUNTER (INPATIENT)
Age: 22
LOS: 3 days | Discharge: HOME | DRG: 560 | End: 2017-12-30
Attending: OBSTETRICS & GYNECOLOGY | Admitting: OBSTETRICS & GYNECOLOGY
Payer: MEDICAID

## 2017-12-27 DIAGNOSIS — O99.019 IRON DEFICIENCY ANEMIA DURING PREGNANCY, ANTEPARTUM: ICD-10-CM

## 2017-12-27 DIAGNOSIS — D50.9 IRON DEFICIENCY ANEMIA DURING PREGNANCY, ANTEPARTUM: ICD-10-CM

## 2017-12-27 DIAGNOSIS — Z3A.40 40 WEEKS GESTATION OF PREGNANCY: Primary | ICD-10-CM

## 2017-12-27 LAB
ABO + RH BLD: NORMAL
BASOPHILS # BLD: 0 K/UL (ref 0–0.06)
BASOPHILS NFR BLD: 0 % (ref 0–2)
BLOOD GROUP ANTIBODIES SERPL: NORMAL
DIFFERENTIAL METHOD BLD: ABNORMAL
EOSINOPHIL # BLD: 0 K/UL (ref 0–0.4)
EOSINOPHIL NFR BLD: 0 % (ref 0–5)
ERYTHROCYTE [DISTWIDTH] IN BLOOD BY AUTOMATED COUNT: 13.8 % (ref 11.6–14.5)
HCT VFR BLD AUTO: 33.7 % (ref 35–45)
HGB BLD-MCNC: 11 G/DL (ref 12–16)
HSV 1 IGM ANTIBODIES, 165181: NORMAL TITER
HSV 2 AB,IGG, HS2G: >8 AI
HSV 2 IGM ANTIBODIES, 165182: NORMAL TITER
HSV1 IGG SER QL: <0.2 AI
LYMPHOCYTES # BLD: 2.3 K/UL (ref 0.9–3.6)
LYMPHOCYTES NFR BLD: 21 % (ref 21–52)
MCH RBC QN AUTO: 27.2 PG (ref 24–34)
MCHC RBC AUTO-ENTMCNC: 32.6 G/DL (ref 31–37)
MCV RBC AUTO: 83.4 FL (ref 74–97)
MONOCYTES # BLD: 0.7 K/UL (ref 0.05–1.2)
MONOCYTES NFR BLD: 7 % (ref 3–10)
NEUTS SEG # BLD: 8 K/UL (ref 1.8–8)
NEUTS SEG NFR BLD: 72 % (ref 40–73)
PLATELET # BLD AUTO: 271 K/UL (ref 135–420)
PMV BLD AUTO: 10.2 FL (ref 9.2–11.8)
RBC # BLD AUTO: 4.04 M/UL (ref 4.2–5.3)
SPECIMEN EXP DATE BLD: NORMAL
WBC # BLD AUTO: 11.1 K/UL (ref 4.6–13.2)

## 2017-12-27 PROCEDURE — 65270000029 HC RM PRIVATE

## 2017-12-27 PROCEDURE — 85025 COMPLETE CBC W/AUTO DIFF WBC: CPT | Performed by: OBSTETRICS & GYNECOLOGY

## 2017-12-27 PROCEDURE — 86901 BLOOD TYPING SEROLOGIC RH(D): CPT | Performed by: OBSTETRICS & GYNECOLOGY

## 2017-12-27 PROCEDURE — 74011250636 HC RX REV CODE- 250/636: Performed by: OBSTETRICS & GYNECOLOGY

## 2017-12-27 PROCEDURE — 74011250637 HC RX REV CODE- 250/637: Performed by: OBSTETRICS & GYNECOLOGY

## 2017-12-27 RX ORDER — NALBUPHINE HYDROCHLORIDE 10 MG/ML
10 INJECTION, SOLUTION INTRAMUSCULAR; INTRAVENOUS; SUBCUTANEOUS
Status: DISCONTINUED | OUTPATIENT
Start: 2017-12-27 | End: 2017-12-28 | Stop reason: HOSPADM

## 2017-12-27 RX ORDER — LIDOCAINE HYDROCHLORIDE 10 MG/ML
20 INJECTION, SOLUTION EPIDURAL; INFILTRATION; INTRACAUDAL; PERINEURAL AS NEEDED
Status: DISCONTINUED | OUTPATIENT
Start: 2017-12-27 | End: 2017-12-28 | Stop reason: HOSPADM

## 2017-12-27 RX ORDER — ZOLPIDEM TARTRATE 5 MG/1
5 TABLET ORAL ONCE
Status: COMPLETED | OUTPATIENT
Start: 2017-12-27 | End: 2017-12-27

## 2017-12-27 RX ORDER — BUTORPHANOL TARTRATE 1 MG/ML
2 INJECTION INTRAMUSCULAR; INTRAVENOUS
Status: DISCONTINUED | OUTPATIENT
Start: 2017-12-27 | End: 2017-12-28 | Stop reason: HOSPADM

## 2017-12-27 RX ORDER — TERBUTALINE SULFATE 1 MG/ML
0.25 INJECTION SUBCUTANEOUS
Status: DISCONTINUED | OUTPATIENT
Start: 2017-12-27 | End: 2017-12-28 | Stop reason: HOSPADM

## 2017-12-27 RX ORDER — MINERAL OIL
30 OIL (ML) ORAL AS NEEDED
Status: DISCONTINUED | OUTPATIENT
Start: 2017-12-27 | End: 2017-12-28 | Stop reason: HOSPADM

## 2017-12-27 RX ORDER — SODIUM CHLORIDE, SODIUM LACTATE, POTASSIUM CHLORIDE, CALCIUM CHLORIDE 600; 310; 30; 20 MG/100ML; MG/100ML; MG/100ML; MG/100ML
125 INJECTION, SOLUTION INTRAVENOUS CONTINUOUS
Status: DISCONTINUED | OUTPATIENT
Start: 2017-12-27 | End: 2017-12-28 | Stop reason: HOSPADM

## 2017-12-27 RX ORDER — OXYTOCIN/RINGER'S LACTATE 20/1000 ML
125 PLASTIC BAG, INJECTION (ML) INTRAVENOUS CONTINUOUS
Status: DISCONTINUED | OUTPATIENT
Start: 2017-12-27 | End: 2017-12-28 | Stop reason: HOSPADM

## 2017-12-27 RX ORDER — OXYTOCIN/RINGER'S LACTATE 20/1000 ML
500 PLASTIC BAG, INJECTION (ML) INTRAVENOUS ONCE
Status: ACTIVE | OUTPATIENT
Start: 2017-12-27 | End: 2017-12-28

## 2017-12-27 RX ORDER — METHYLERGONOVINE MALEATE 0.2 MG/ML
0.2 INJECTION INTRAVENOUS AS NEEDED
Status: DISCONTINUED | OUTPATIENT
Start: 2017-12-27 | End: 2017-12-28 | Stop reason: HOSPADM

## 2017-12-27 RX ORDER — HYDROMORPHONE HYDROCHLORIDE 1 MG/ML
1 INJECTION, SOLUTION INTRAMUSCULAR; INTRAVENOUS; SUBCUTANEOUS
Status: DISCONTINUED | OUTPATIENT
Start: 2017-12-27 | End: 2017-12-28 | Stop reason: HOSPADM

## 2017-12-27 RX ADMIN — SODIUM CHLORIDE, SODIUM LACTATE, POTASSIUM CHLORIDE, AND CALCIUM CHLORIDE 125 ML/HR: 600; 310; 30; 20 INJECTION, SOLUTION INTRAVENOUS at 19:30

## 2017-12-27 RX ADMIN — ZOLPIDEM TARTRATE 5 MG: 5 TABLET ORAL at 23:54

## 2017-12-27 RX ADMIN — DINOPROSTONE 10 MG: 10 INSERT, EXTENDED RELEASE VAGINAL at 17:58

## 2017-12-27 NOTE — IP AVS SNAPSHOT
[address] Patient: Belen Whaley MRN: [id number] :1995 About your hospitalization You were admitted on:  2017 You last received care in the:  [facility] [address] You were discharged on:  2017 Why you were hospitalized Your primary diagnosis was:  Not on File Your diagnoses also included:  Pregnancy Things You Need To Do (next 8 weeks) Follow up with Otis Fontana CNM Phone:  [phone] Where:  [address] [facility], [address] [place] [address] Discharge Orders None A check ethan indicates which time of day the medication should be taken. My Medications STOP taking these medications   
 valACYclovir 1 gram tablet Commonly known as:  VALTREX  
   
  
  
TAKE these medications as instructed Instructions Each Dose to Equal  
 Morning Noon Evening Bedtime AMBULATORY BREAST PUMP Your last dose was: Your next dose is:    
   
   
 Use breast pump as directed for breast feeding AMBULATORY BREAST PUMP Your last dose was: Your next dose is:    
   
   
 Use as directed for breast feeding  
     
   
   
   
  
 docusate sodium 100 mg capsule Commonly known as:  Carletha Benne Your last dose was: Your next dose is: Take 1 Cap by mouth two (2) times daily as needed for Constipation for up to 90 days. 100 mg  
    
   
   
   
  
 ferrous fumarate 324 mg (106 mg iron) Tab Your last dose was: Your next dose is: Take 1 Tab by mouth two (2) times a day. 1 Tab  
    
   
   
   
  
 ibuprofen 800 mg tablet Commonly known as:  MOTRIN Your last dose was: Your next dose is: Take 1 Tab by mouth every eight (8) hours as needed.   
 800 mg  
    
   
   
   
  
 oxyCODONE-acetaminophen 5-325 mg per tablet Commonly known as:  PERCOCET Your last dose was: Your next dose is: Take 1 Tab by mouth every four (4) hours as needed. Max Daily Amount: 6 Tabs. 1 Tab  
    
   
   
   
  
 prenatal vit-iron fumarate-fa 27 mg iron- 0.8 mg Tab tablet Commonly known as:  PRENATAL VITAMIN Your last dose was: Your next dose is: Take 1 Tab by mouth daily. 1 Tab Where to Get Your Medications Information on where to get these meds will be given to you by the nurse or doctor. ! Ask your nurse or doctor about these medications  
  docusate sodium 100 mg capsule  
 ferrous fumarate 324 mg (106 mg iron) Tab  
 ibuprofen 800 mg tablet  
 oxyCODONE-acetaminophen 5-325 mg per tablet Discharge Instructions Discharge Instructions: Vaginal Delivery Signs of Illness: CALL YOUR PROVIDER IF ANY OF THE FOLLOWING OCCUR 1. Temperature of 100.4 or above 2. Chills 3. Severe abdominal pain 4. Excessive vaginal bleeding (more than 1 pad/hour, or any bleeding like a heavy period over the next 2 weeks) 5. Large amount of clots 6. Unusual discharge or drainage 7. Discharge with foul odor 8. Pain or burning on urination, or problems having a BM 9. Painful, reddened, tender breasts 10: Pain/ swelling/ redness in the calf of your legs. 11. Other symptoms you do not understand Activity 1. Rest when your baby rests 2. 1-2 weeks after delivery, gradually increase your activity General Concerns 1. Eat healthy, proper nutrition and adequate fluids are essential for healing, strength and energy. 2. Continue monthly self breast exams 3. No douching, tampons or intercourse for 6 weeks 4. No tub baths, pools, or hot tubs for 6 weeks Follow-up Call you provider on the day of discharge to schedule a follow-up appointment in 2 weeks. Call [phone] if you have any questions, and ask to speak with a nurse. DISCHARGE SUMMARY from Nurse The following personal items collected during your admission are returned to you:  
Dental Appliance: Dental Appliances: None Vision:   
Hearing Aid:   
Jewelry: Jewelry: None Clothing: Clothing: None Other Valuables: Other Valuables: Cell Phone Valuables sent to safe:   
 
 
 
 
 
 
*  Please give a list of your current medications to your Primary Care Provider. *  Please update this list whenever your medications are discontinued, doses are 
    changed, or new medications (including over-the-counter products) are added. *  Please carry medication information at all times in case of emergency situations. These are general instructions for a healthy lifestyle: No smoking/ No tobacco products/ Avoid exposure to second hand smoke Surgeon General's Warning:  Quitting smoking now greatly reduces serious risk to your health. Obesity, smoking, and sedentary lifestyle greatly increases your risk for illness A healthy diet, regular physical exercise & weight monitoring are important for maintaining a healthy lifestyle You may be retaining fluid if you have a history of heart failure or if you experience any of the following symptoms:  Weight gain of 3 pounds or more overnight or 5 pounds in a week, increased swelling in our hands or feet or shortness of breath while lying flat in bed. Please call your doctor as soon as you notice any of these symptoms; do not wait until your next office visit. Recognize signs and symptoms of STROKE: 
 
F-face looks uneven A-arms unable to move or move unevenly S-speech slurred or non-existent T-time-call 911 as soon as signs and symptoms begin-DO NOT go Back to bed or wait to see if you get better-TIME IS BRAIN. The discharge information has been reviewed with the patient. The patient verbalized understanding. Patient armband removed and shredded MyChart Activation Thank you for requesting access to [employer]. Please follow the instructions below to securely access and download your online medical record. [employer] allows you to send messages to your doctor, view your test results, renew your prescriptions, schedule appointments, and more. How Do I Sign Up? 1. In your internet browser, go to [email or web address]. 2. Click on the First Time User? Click Here link in the Sign In box. You will see the New Member Sign Up page. 3. Enter your [employer] Access Code exactly as it appears below. You will not need to use this code after youve completed the sign-up process. If you do not sign up before the expiration date, you must request a new code. [employer] Access Code: Activation code not generated Current [employer] Status: Patient Declined (This is the date your [employer] access code will ) 4. Enter the last four digits of your Social Security Number (xxxx) and Date of Birth (mm/dd/yyyy) as indicated and click Submit. You will be taken to the next sign-up page. 5. Create a [employer] ID. This will be your [employer] login ID and cannot be changed, so think of one that is secure and easy to remember. 6. Create a [employer] password. You can change your password at any time. 7. Enter your Password Reset Question and Answer. This can be used at a later time if you forget your password. 8. Enter your e-mail address. You will receive e-mail notification when new information is available in [address] 9. Click Sign Up. You can now view and download portions of your medical record. 10. Click the Download Summary menu link to download a portable copy of your medical information. Additional Information If you have questions, please visit the Frequently Asked Questions section of the [employer] website at [email or web address] Remember, MyChart is NOT to be used for urgent needs. For medical emergencies, dial 911. After Your Delivery (the Postpartum Period): After Your Visit Your Care Instructions Congratulations on the birth of your baby. Like pregnancy, the  period can be a time of excitement, biju, and exhaustion. You may look at your wondrous little baby and feel happy. You may also be overwhelmed by your new sleep hours and new responsibilities. At first, babies often sleep during the days and are awake at night. They do not have a pattern or routine. They may make sudden gasps, jerk themselves awake, or look like they have crossed eyes. These are all normal, and they may even make you smile. In these first weeks after delivery, try to take good care of yourself. It may take 4 to 6 weeks to feel like yourself again, and possibly longer if you had a  birth. You will likely feel very tired for several weeks. Your days will be full of ups and downs, but lots of biju as well. Follow-up care is a key part of your treatment and safety. Be sure to make and go to all appointments, and call your doctor if you are having problems. Its also a good idea to know your test results and keep a list of the medicines you take. How can you care for yourself at home? Take care of your body after delivery · Use pads instead of tampons for the bloody flow that may last as long as 2 weeks. · Ease cramps with acetaminophen (Tylenol). · Ease soreness of hemorrhoids and the area between your vagina and rectum with ice compresses or witch hazel pads. · Ease constipation by drinking lots of fluid and eating high-fiber foods. Ask your doctor about over-the-counter stool softeners. · Cleanse yourself with a gentle squeeze of warm water from a bottle instead of wiping with toilet paper. · Take a sitz bath in warm water several times a day. · Wear a good nursing bra. Ease sore and swollen breasts with warm, wet washcloths. · If you are not breast-feeding, use ice rather than heat for breast soreness. · Your period may not start for several months if you are breast-feeding. You may bleed more, and longer at first, than you did before you got pregnant. · Wait until you are healed (about 4 to 6 weeks) before you have sexual intercourse. Your doctor will tell you when it is okay to have sex. · Try not to travel with your baby for 5 or 6 weeks. If you take a long car trip, make frequent stops to walk around and stretch. Avoid exhaustion · Rest every day. Try to nap when your baby naps. · Ask another adult to be with you for a few days after delivery. · Plan for  if you have other children. · Stay flexible so you can eat at odd hours and sleep when you need to. Both you and your baby are making new schedules. · Plan small trips to get out of the house. Change can make you feel less tired. · Ask for help with housework, cooking, and shopping. Remind yourself that your job is to care for your baby. Know about help for postpartum depression · \"Baby blues are common for the first 1 to 2 weeks after birth. You may cry or feel sad or irritable for no reason. · Rest whenever you can. Being tired makes it harder to handle your emotions. · Go for walks with your baby. · Talk to your partner, friends, and family about your feelings. · If your symptoms last for more than a few weeks, or if you feel very depressed, ask your doctor for help. · Postpartum depression can be treated. Support groups and counseling can help. Sometimes medicine can also help. Stay healthy · Eat healthy foods so you have more energy, make good breast milk, and lose extra baby pounds. · If you breast-feed, avoid alcohol and drugs. Stay smoke-free. If you quit during pregnancy, congratulations. · Start daily exercise after 4 to 6 weeks, but rest when you feel tired. · Learn exercises to tone your belly.  Do Kegel exercises to regain strength in your pelvic muscles. You can do these exercises while you stand or sit. ¨ Squeeze the same muscles you would use to stop your urine. Your belly and rear end (buttocks) should not move. ¨ Hold the squeeze for 3 seconds, then relax for 3 seconds. ¨ Repeat the exercise 10 to 15 times for each session. Do three or more sessions each day. · Find a class for new mothers and new babies that has an exercise time. · If you had a  birth, give yourself a bit more time before you exercise, and be careful. When should you call for help? Call 911 anytime you think you may need emergency care. For example, call if: 
· You have sudden, severe pain in your belly. · You passed out (lost consciousness). Call your doctor now or seek immediate medical care if: 
· You have severe vaginal bleeding. You are passing blood clots and soaking through a pad each hour for 2 or more hours. · Your vaginal bleeding seems to be getting heavier or is still bright red 4 days after delivery, or you pass blood clots larger than the size of a golf ball. · You are dizzy or lightheaded, or you feel like you may faint. · You are vomiting or cannot keep fluids down. · You have a fever. · You have new or more belly pain. · You pass tissue (not just blood). · Your breast or breasts have hard, red, or tender areas. · You have an urgent or frequent need to urinate, along with a burning feeling. · You have severe pain, tenderness, or swelling in your vagina or the area between your rectum and vagina. · You have severe pains in your chest, belly, back, or legs. · You have feelings of severe despair or great anxiety. · Your baby is unusually cranky or is sleeping too much. · Your baby's eyes are red or have discharge. · Your baby has white patches on the roof and sides of the mouth or tongue. · Your baby's umbilical cord is foul-smelling, swollen, red, or leaking pus. · There is blood or mucus in your baby's bowel movements. · Your baby has fewer than 6 wet diapers a day. · Your baby does not want to eat, or your baby is throwing up with every feeding. · Your baby has trouble breathing. · Your baby has a rectal temperature of 100.4°F or more, or an underarm temperature over 99.4°F. 
· You baby has a low temperature less than 97.5°F rectal, or less than 97. 0°F underarm. · Your baby's skin looks yellow. Watch closely for changes in your health, and be sure to contact your doctor if you have any problems. Where can you learn more? Go to [employer].be Enter A461 in the search box to learn more about \"After Your Delivery (the Postpartum Period): After Your Visit. \"  
© 5115-5009 Healthwise, Incorporated. Care instructions adapted under license by [employer] (which disclaims liability or warranty for this information). This care instruction is for use with your licensed healthcare professional. If you have questions about a medical condition or this instruction, always ask your healthcare professional. [address] any warranty or liability for your use of this information. Content Version: 9.3.04574; Last Revised: July 8, 2010 Depression After Childbirth: After Your Visit Your Care Instructions Many women get the \"baby blues\" during the first few days after childbirth. They may lose sleep, feel irritable, cry easily, and feel happy one minute and sad the next. Hormone changes are one cause of these emotional changes. Also, the demands of a new baby, coupled with visits from relatives or other family needs, add to a mother's stress. The \"baby blues\" usually peak around the fourth day and then ease up in less than 2 weeks. If your moodiness or anxiety lasts for more than 2 weeks, or if you feel like life is not worth living, you may have postpartum depression.  This is different for each mother. Some mothers with serious depression may worry intensely about their infant's well-being, while others may feel distant from their child. Some mothers might even feel that they might harm their baby. A mother may have signs of paranoia, wondering if someone is watching her. Depression is not a sign of weakness. It is a medical condition that requires treatment. Medicine and counseling are often effective at reducing depression. Talk to your doctor about taking antidepressant medicine while breast-feeding. Follow-up care is a key part of your treatment and safety. Be sure to make and go to all appointments, and call your doctor if you are having problems. Its also a good idea to know your test results and keep a list of the medicines you take. How can you care for yourself at home? · Take your medicines exactly as prescribed. Call your doctor if you think you are having a problem with your medicine. · Eat a balanced diet, so that you can keep up your energy. · Get regular daily exercise, such as walks, to help improve your mood. · Get as much sunlight as possible. Keep your shades and curtains open, and get outside as much as you can. · Avoid using alcohol or other substances to feel better. · Get as much rest and sleep as possible, and avoid doing too much. Being too tired can increase depression. · Play stimulating music throughout your day and soothing music at night. · Schedule outings and visits with friends and family. Ask them to call you regularly, so that you do not feel alone. · Ask for help with preparing food and other daily tasks. Family and friends are often happy to help a mother with a . · Be honest with yourself and those who care about you. Tell them about your struggle. · Join a support group of new mothers. No one can better understand the challenges of caring for a  than other new mothers. When should you call for help? Call 911 anytime you think you may need emergency care. For example, call if: 
· You feel you cannot stop from hurting yourself or someone else. Call your doctor now or seek immediate medical care if: 
· You are having trouble caring for yourself or your baby. · You have signs of paranoia that can occur with postpartum depression. You fear that someone is watching you, stealing from you, or reading your mind. · You hear voices. · You have symptoms of postpartum depression, such as: ¨ Sleeplessness. ¨ Anxiety. ¨ Hopelessness. ¨ Irritability. ¨ Poor concentration. · Someone you know has depression and: 
¨ Starts to give away his or her possessions. ¨ Uses illegal drugs or drinks alcohol heavily. ¨ Talks or writes about death, including writing suicide notes and talking about guns, knives, or pills. ¨ Starts to spend a lot of time alone. ¨ Acts very aggressively or suddenly appears calm. Watch closely for changes in your health, and be sure to contact your doctor if you have any problems. Where can you learn more? Go to [employer].be Enter Y605 in the search box to learn more about \"Depression After Childbirth: After Your Visit. \"  
© 9168-9055 Healthwise, Incorporated. Care instructions adapted under license by [employer] (which disclaims liability or warranty for this information). This care instruction is for use with your licensed healthcare professional. If you have questions about a medical condition or this instruction, always ask your healthcare professional. [place] [address] any warranty or liability for your use of this information. Content Version: 9.3.85476; Last Revised: April 18, 2011 Breast Self-Exam: After Your Visit Your Care Instructions A breast self-exam is when you check your breasts for lumps or changes. This regular exam helps you learn how your breasts normally look and feel. Most breast problems or changes are not because of cancer. Breast self-exam is not a substitute for a mammogram. Having regular breast exams by your doctor and regular mammograms improve your chances of finding any problems with your breasts. Some women set a time each month to do a step-by-step breast self-exam. Other women like a less formal system. They might look at their breasts as they brush their teeth, or feel their breasts once in a while in the shower. If you notice a change in your breast, tell your doctor. Follow-up care is a key part of your treatment and safety. Be sure to make and go to all appointments, and call your doctor if you are having problems. Its also a good idea to know your test results and keep a list of the medicines you take. How do you do a breast self-exam? 
· The best time to examine your breasts is usually one week after your menstrual period begins. Your breasts should not be tender then. If you do not have periods, you might do your exam on a day of the month that is easy to remember. · To examine your breasts: ¨ Remove all your clothes above the waist and lie down. When you are lying down, your breast tissue spreads evenly over your chest wall, which makes it easier to feel all your breast tissue. ¨ Use the padsnot the fingertipsof the 3 middle fingers of your left hand to check your right breast. Move your fingers slowly in small coin-sized circles that overlap. ¨ Use three levels of pressure to feel of all your breast tissue. Use light pressure to feel the tissue close to the skin surface. Use medium pressure to feel a little deeper. Use firm pressure to feel your tissue close to your breastbone and ribs. Use each pressure level to feel your breast tissue before moving on to the next spot. ¨ Check your entire breast, moving up and down as if following a strip from the collarbone to the bra line, and from the armpit to the ribs. Repeat until you have covered the entire breast. 
¨ Repeat this procedure for your left breast, using the pads of the 3 middle fingers of your right hand. · To examine your breasts while in the shower: 
¨ Place one arm over your head and lightly soap your breast on that side. ¨ Using the pads of your fingers, gently move your hand over your breast (in the strip pattern described above), feeling carefully for any lumps or changes. ¨ Repeat for the other breast. 
· Have your doctor inspect anything you notice to see if you need further testing. Where can you learn more? Go to [employer].be Enter P148 in the search box to learn more about \"Breast Self-Exam: After Your Visit. \"  
© 8771-1093 Healthwise, Incorporated. Care instructions adapted under license by [employer] (which disclaims liability or warranty for this information). This care instruction is for use with your licensed healthcare professional. If you have questions about a medical condition or this instruction, always ask your healthcare professional. [place] [address] any warranty or liability for your use of this information. Content Version: 9.3.99237; Last Revised: September 6, 2011 Breast-Feeding: After Your Visit Your Care Instructions Breast-feeding has many benefits. It may lower your baby's chances of getting an infection. It also may prevent your baby from having problems such as diabetes and high cholesterol later in life. Breast-feeding also helps you bond with your baby. The American Academy of Pediatrics recommends breast-feeding for at least a year. That may be very hard for many women to do, but breast-feeding even for a shorter period of time is a health benefit to you and your baby. In the first days after birth, your breasts make a thick, yellow liquid called colostrum.  This liquid gives your baby nutrients and antibodies against infection. It is all that babies need in the first days after birth. Your breasts will fill with milk a few days after the birth. Breast-feeding is a skill that gets better with practice. It is normal to have some problems. Some women have sore or cracked nipples, blocked milk ducts, or a breast infection (mastitis). But if you feed your baby every 1 to 2 hours during the day and use good breast-feeding methods, you may not have these problems. You can treat these problems if they happen and continue breast-feeding. Follow-up care is a key part of your treatment and safety. Be sure to make and go to all appointments, and call your doctor if you are having problems. Its also a good idea to know your test results and keep a list of the medicines you take. How can you care for yourself at home? · Breast-feed your baby whenever he or she is hungry. In the first 2 weeks, your baby will feed about every 1 to 3 hours. This will help you keep up your supply of milk. · Put a bed pillow or a nursing pillow on your lap to support your arms and your baby. · Hold your baby in a comfortable position. ¨ You can hold your baby in several ways. One of the most common positions is the cradle hold. One arm supports your baby, with his or her head in the bend of your elbow. Your open hand supports your baby's bottom or back. Your baby's belly lies against yours. ¨ If you had your baby by , or , try the football hold. This position keeps your baby off your belly. Tuck your baby under your arm, with his or her body along the side you will be feeding on. Support your baby's upper body with your arm. With that hand you can control your baby's head to bring his or her mouth to your breast. 
¨ Try different positions with each feeding. If you are having problems, ask for help from your doctor or a lactation consultant. · To get your baby to latch on: ¨ Support and narrow your breast with one hand using a \"U hold,\" with your thumb on the outer side of your breast and your fingers on the inner side. You can also use a \"C hold,\" with all your fingers below the nipple and your thumb above it. Try the different holds to get the deepest latch for whichever breast-feeding position you use. Your other arm is behind your baby's back, with your hand supporting the base of the baby's head. Position your fingers and thumb to point toward your baby's ears. ¨ You can touch your baby's lower lip with your nipple to get your baby to open his or her mouth. Wait until your baby opens up really wide, like a big yawn. Then be sure to bring the baby quickly to your breastnot your breast to the baby. As you bring your baby toward your breast, use your other hand to support the breast and guide it into his or her mouth. ¨ Both the nipple and a large portion of the darker area around the nipple (areola) should be in the baby's mouth. The baby's lips should be flared outward, not folded in (inverted). ¨ Listen for a regular sucking and swallowing pattern while the baby is feeding. If you cannot see or hear a swallowing pattern, watch the baby's ears, which will wiggle slightly when the baby swallows. If the baby's nose appears to be blocked by your breast, tilt the baby's head back slightly, so just the edge of one nostril is clear for breathing. ¨ When your baby is latched, you can usually remove your hand from supporting your breast and bring it under your baby to cradle him or her. Now just relax and breast-feed your baby. · You will know that your baby is feeding well when: 
¨ His or her mouth covers a lot of the areola, and the lips are flared out. ¨ His or her chin and nose rest against your breast. 
¨ Sucking is deep and rhythmic, with short pauses. ¨ You are able to see and hear your baby swallowing. ¨ You do not feel pain in your nipple. · If your baby takes only one breast at a feeding, start the next feeding on the other breast. 
· Anytime you need to remove your baby from the breast, put one finger in the corner of his or her mouth. Push your finger between your baby's gums to gently break the seal. If you do not break the tight seal before you remove your baby, your nipples can become sore, cracked, or bruised. · After feeding your baby, gently pat his or her back to let out any swallowed air. After your baby burps, offer the breast again, or offer the other breast. Sometimes a baby will want to keep feeding after being burped. When should you call for help? Call your doctor now or seek immediate medical care if: 
· You have problems with breast-feeding, such as: ¨ Sore, red nipples. ¨ Stabbing or burning breast pain. ¨ A hard lump in your breast. 
¨ A fever, chills, or flu-like symptoms. Watch closely for changes in your health, and be sure to contact your doctor if: 
· Your baby has trouble latching on to your breast. 
· You continue to have pain or discomfort when breast-feeding. · Your baby wets fewer than 4 diapers a day. · You have other questions or concerns. Where can you learn more? Go to [employer] Enter P492 in the search box to learn more about \"Breast-Feeding: After Your Visit. \"  
© 6086-8686 Healthwise, Incorporated. Care instructions adapted under license by [facility] (which disclaims liability or warranty for this information). This care instruction is for use with your licensed healthcare professional. If you have questions about a medical condition or this instruction, always ask your healthcare professional. [place] [address] any warranty or liability for your use of this information. Content Version: 9.3.22014; Last Revised: February 10, 2012 TOOVIA Announcement  We are excited to announce that we are making your provider's discharge notes available to you in MyChart. You will see these notes when they are completed and signed by the physician that discharged you from your recent hospital stay. If you have any questions or concerns about any information you see in MyChart, please call the Health Information Department where you were seen or reach out to your Primary Care Provider for more information about your plan of care. Providers Seen During Your Hospitalization Provider Specialty Primary office phone Alverto Mckeon MD Obstetrics & Gynecology [phone] Immunizations Administered for This Admission Name Date Tdap 12/30/2017 Your Primary Care Physician (PCP) Primary Care Physician Office Phone Office Fax Isidro Benton 36, [address] [facility] [phone] You are allergic to the following No active allergies Recent Documentation Height Weight Breastfeeding? BMI OB Status Smoking Status 1.676 m 104.1 kg Unknown 37.06 kg/m2 Recent pregnancy Former Smoker Emergency Contacts Name Discharge Info Relation Home Work Mobile Troy Poisson DISCHARGE CAREGIVER [3] Mother [14] [phone] Patient Belongings The following personal items are in your possession at time of discharge: 
  Dental Appliances: None         Home Medications: None   Jewelry: None  Clothing: None    Other Valuables: Cell Phone Please provide this summary of care documentation to your next provider. Signatures-by signing, you are acknowledging that this After Visit Summary has been reviewed with you and you have received a copy. Patient Signature:  ____________________________________________________________ Date:  ____________________________________________________________  
  
Jonathan Gardiner Provider Signature:  ____________________________________________________________ Date:  ____________________________________________________________

## 2017-12-27 NOTE — PROGRESS NOTES
1600-Pt arrived ambulatory for scheduled cervidil induction. Denies bleeding, denies LOF, +FM, abdomen non tender and soft. EFM/TOCO applied. Discussed plan of care, and signed consents. Pt states she is currently taking valtrex. 1710-Received cervidil from pharmacy.      1800-Cervidil placed  SVE 1.5/50/-3

## 2017-12-28 ENCOUNTER — ANESTHESIA EVENT (OUTPATIENT)
Dept: LABOR AND DELIVERY | Age: 22
DRG: 560 | End: 2017-12-28
Payer: MEDICAID

## 2017-12-28 ENCOUNTER — ANESTHESIA (OUTPATIENT)
Dept: LABOR AND DELIVERY | Age: 22
DRG: 560 | End: 2017-12-28
Payer: MEDICAID

## 2017-12-28 PROBLEM — Z34.90 PREGNANCY: Status: ACTIVE | Noted: 2017-12-28

## 2017-12-28 PROCEDURE — 3E0R3BZ INTRODUCTION OF ANESTHETIC AGENT INTO SPINAL CANAL, PERCUTANEOUS APPROACH: ICD-10-PCS | Performed by: ANESTHESIOLOGY

## 2017-12-28 PROCEDURE — 74011250636 HC RX REV CODE- 250/636: Performed by: ANESTHESIOLOGY

## 2017-12-28 PROCEDURE — 59025 FETAL NON-STRESS TEST: CPT

## 2017-12-28 PROCEDURE — 74011250636 HC RX REV CODE- 250/636: Performed by: OBSTETRICS & GYNECOLOGY

## 2017-12-28 PROCEDURE — 75410000000 HC DELIVERY VAGINAL/SINGLE

## 2017-12-28 PROCEDURE — 0HQ9XZZ REPAIR PERINEUM SKIN, EXTERNAL APPROACH: ICD-10-PCS | Performed by: OBSTETRICS & GYNECOLOGY

## 2017-12-28 PROCEDURE — 74011000250 HC RX REV CODE- 250: Performed by: ANESTHESIOLOGY

## 2017-12-28 PROCEDURE — 76060000078 HC EPIDURAL ANESTHESIA

## 2017-12-28 PROCEDURE — 75410000002 HC LABOR FEE PER 1 HR

## 2017-12-28 PROCEDURE — 75410000003 HC RECOV DEL/VAG/CSECN EA 0.5 HR

## 2017-12-28 PROCEDURE — 74011250637 HC RX REV CODE- 250/637: Performed by: OBSTETRICS & GYNECOLOGY

## 2017-12-28 PROCEDURE — 65270000029 HC RM PRIVATE

## 2017-12-28 PROCEDURE — 77030005538 HC CATH URETH FOL44 BARD -B

## 2017-12-28 PROCEDURE — 77030014125 HC TY EPDRL BBMI -B

## 2017-12-28 PROCEDURE — 77010026065 HC OXYGEN MINIMUM MEDICAL AIR

## 2017-12-28 RX ORDER — OXYCODONE AND ACETAMINOPHEN 5; 325 MG/1; MG/1
2 TABLET ORAL
Status: DISCONTINUED | OUTPATIENT
Start: 2017-12-28 | End: 2017-12-30 | Stop reason: HOSPADM

## 2017-12-28 RX ORDER — ACETAMINOPHEN 325 MG/1
650 TABLET ORAL
Status: DISCONTINUED | OUTPATIENT
Start: 2017-12-28 | End: 2017-12-30 | Stop reason: HOSPADM

## 2017-12-28 RX ORDER — FENTANYL CITRATE 50 UG/ML
100 INJECTION, SOLUTION INTRAMUSCULAR; INTRAVENOUS ONCE
Status: COMPLETED | OUTPATIENT
Start: 2017-12-28 | End: 2017-12-28

## 2017-12-28 RX ORDER — DOCUSATE SODIUM 100 MG/1
100 CAPSULE, LIQUID FILLED ORAL
Status: DISCONTINUED | OUTPATIENT
Start: 2017-12-28 | End: 2017-12-30 | Stop reason: HOSPADM

## 2017-12-28 RX ORDER — IBUPROFEN 400 MG/1
800 TABLET ORAL
Status: DISCONTINUED | OUTPATIENT
Start: 2017-12-28 | End: 2017-12-30 | Stop reason: HOSPADM

## 2017-12-28 RX ORDER — SODIUM CHLORIDE 0.9 % (FLUSH) 0.9 %
5-10 SYRINGE (ML) INJECTION EVERY 8 HOURS
Status: DISCONTINUED | OUTPATIENT
Start: 2017-12-28 | End: 2017-12-28 | Stop reason: HOSPADM

## 2017-12-28 RX ORDER — NALBUPHINE HYDROCHLORIDE 10 MG/ML
2.5 INJECTION, SOLUTION INTRAMUSCULAR; INTRAVENOUS; SUBCUTANEOUS
Status: DISCONTINUED | OUTPATIENT
Start: 2017-12-28 | End: 2017-12-28 | Stop reason: HOSPADM

## 2017-12-28 RX ORDER — SODIUM CHLORIDE 0.9 % (FLUSH) 0.9 %
5-10 SYRINGE (ML) INJECTION AS NEEDED
Status: DISCONTINUED | OUTPATIENT
Start: 2017-12-28 | End: 2017-12-28 | Stop reason: HOSPADM

## 2017-12-28 RX ORDER — ROPIVACAINE HYDROCHLORIDE 2 MG/ML
10 INJECTION, SOLUTION EPIDURAL; INFILTRATION; PERINEURAL
Status: COMPLETED | OUTPATIENT
Start: 2017-12-28 | End: 2017-12-28

## 2017-12-28 RX ORDER — PHENYLEPHRINE HCL IN 0.9% NACL 0.4MG/10ML
80 SYRINGE (ML) INTRAVENOUS AS NEEDED
Status: DISCONTINUED | OUTPATIENT
Start: 2017-12-28 | End: 2017-12-28 | Stop reason: HOSPADM

## 2017-12-28 RX ORDER — OXYTOCIN/RINGER'S LACTATE 20/1000 ML
.5-2 PLASTIC BAG, INJECTION (ML) INTRAVENOUS
Status: DISCONTINUED | OUTPATIENT
Start: 2017-12-28 | End: 2017-12-30 | Stop reason: HOSPADM

## 2017-12-28 RX ORDER — ZOLPIDEM TARTRATE 5 MG/1
5 TABLET ORAL
Status: DISCONTINUED | OUTPATIENT
Start: 2017-12-28 | End: 2017-12-30 | Stop reason: HOSPADM

## 2017-12-28 RX ORDER — PROMETHAZINE HYDROCHLORIDE 25 MG/ML
25 INJECTION, SOLUTION INTRAMUSCULAR; INTRAVENOUS
Status: DISCONTINUED | OUTPATIENT
Start: 2017-12-28 | End: 2017-12-30 | Stop reason: HOSPADM

## 2017-12-28 RX ADMIN — FENTANYL CITRATE 100 MCG: 50 INJECTION INTRAMUSCULAR; INTRAVENOUS at 08:00

## 2017-12-28 RX ADMIN — Medication 2 MILLI-UNITS/MIN: at 08:27

## 2017-12-28 RX ADMIN — Medication 10 ML/HR: at 08:09

## 2017-12-28 RX ADMIN — IBUPROFEN 800 MG: 400 TABLET ORAL at 23:17

## 2017-12-28 RX ADMIN — ROPIVACAINE HYDROCHLORIDE 20 MG: 2 INJECTION, SOLUTION EPIDURAL; INFILTRATION at 08:00

## 2017-12-28 RX ADMIN — SODIUM CHLORIDE, SODIUM LACTATE, POTASSIUM CHLORIDE, AND CALCIUM CHLORIDE 125 ML/HR: 600; 310; 30; 20 INJECTION, SOLUTION INTRAVENOUS at 05:26

## 2017-12-28 NOTE — PROCEDURES
Delivery Note    Obstetrician:  Candida Flores MD    Assistant: none    Pre-Delivery Diagnosis: Term pregnancy, Induced labor or Single fetus    Post-Delivery Diagnosis: Living  infant(s) or Female    Intrapartum Event: None    Procedure: Spontaneous vaginal delivery    Epidural: YES    Monitor:  Fetal Heart Tones - External and Uterine Contractions - External    Indications for instrumental delivery: none    Estimated Blood Loss: 200 cc    Episiotomy: none    Laceration(s):  1st degree and left vaginal sidewall repaired with 3-0 vicryl without complication    Laceration(s) repair: YES    Presentation: Cephalic    Fetal Description: lopez    Fetal Position: Right Occiput Anterior    Birth Weight: see chart    Birth Length: see chart    Apgar - One Minute: 8    Apgar - Five Minutes: 9    Umbilical Cord: No nuchal and 3 vessels present    Specimens: cord blood           Complications:  None. Placenta delivered intact without complications.            Cord Blood Results:   Information for the patient's :  Floridalma Oliver, Baby Girl Nazario Carbajal [[id number]]   No results found for: PCTABR, Mechelle Yap, 82 Naomi Ludwig    Prenatal Labs:     Lab Results   Component Value Date/Time    ABO/Rh(D) A POSITIVE 2017 05:05 PM    HBsAg, External negative 2017    Rubella, External Immune 2017        Attending Attestation: I performed the procedure    Signed By:  Candida Flores MD     2017

## 2017-12-28 NOTE — PROGRESS NOTES
[address] [facility] Dr. Marcella Lawson made aware of patient request for epidural    0726- Anesthesia paged for epidural    5880- Patient standing at bedside, monitors adjusted     0746- Patient ambulating to restroom     0751-Anesthesia at bedside    0754- Timeout complete    0800- epidural cat in place, test dose given     0935- Patient placed on peanut ball     0945- Dr. Marcella Lawson made aware of patient status    46- Dr. Marcella Lawson at bedside, SVE 9cm, AROM- clear fluid    1029- Patient placed back on peanut ball

## 2017-12-28 NOTE — PROGRESS NOTES
1915: Bedside and Verbal shift change report given to Eli Aguirre RN  (oncoming nurse) by Swapnil Leonard RN  (offgoing nurse). Report included the following information SBAR, Kardex, Intake/Output, MAR and Recent Results. 2204: Pt repositioned to left side lying position. Lights dimmed. 0055: pt up to bathroom. 6678: Maternal tracing noted. US adjusted. 1556: Upon entering room pt squatting on birthing ball and swaying back and forth. US and toco adjusted. 9949: SVE 2.5/80/-2. Pt off monitor and ambulatory to bathroom for bath in Jacuzzi tub.     0700: pt request epidural at this time. IVF bolus initiated. 0715: Bedside and Verbal shift change report given to SHITAL Gongora  (oncoming nurse) by Eli Aguirre RN  (offgoing nurse). Report included the following information SBAR, Kardex, Intake/Output, MAR and Recent Results.  Oncoming nurse made aware of pt request for epidural. IVF bolus infusing in preparation for epidural.

## 2017-12-28 NOTE — ANESTHESIA PREPROCEDURE EVALUATION
Anesthetic History   No history of anesthetic complications            Review of Systems / Medical History  Patient summary reviewed and pertinent labs reviewed    Pulmonary  Within defined limits                 Neuro/Psych   Within defined limits           Cardiovascular                  Exercise tolerance: <4 METS     GI/Hepatic/Renal  Within defined limits              Endo/Other  Within defined limits           Other Findings   Comments:   Risk Factors for Postoperative nausea/vomiting:       History of postoperative nausea/vomiting? NO       Female? YES       Motion sickness? NO       Intended opioid administration for postoperative analgesia? YES      Smoking Abstinence  Current Smoker? NO  Elective Surgery? YES  Seen preoperatively by anesthesiologist or proxy prior to day of surgery? NO  Pt abstained from smoking 24 hours prior to anesthesia?  N/A           Physical Exam    Airway  Mallampati: II  TM Distance: 4 - 6 cm  Neck ROM: normal range of motion   Mouth opening: Normal     Cardiovascular  Regular rate and rhythm,  S1 and S2 normal,  no murmur, click, rub, or gallop             Dental  No notable dental hx       Pulmonary  Breath sounds clear to auscultation               Abdominal  GI exam deferred       Other Findings            Anesthetic Plan    ASA: 2  Anesthesia type: epidural            Anesthetic plan and risks discussed with: Family and Patient

## 2017-12-28 NOTE — ANESTHESIA PROCEDURE NOTES
Epidural Block    Start time: 12/28/2017 7:50 AM  End time: 12/28/2017 8:00 AM  Performed by: Alcira Li by: Nancy Hussein     Pre-Procedure  Indication: at surgeon's request and labor epidural    Preanesthetic Checklist: patient identified, risks and benefits discussed, anesthesia consent, site marked, patient being monitored, timeout performed and anesthesia consent      Epidural:   Patient position:  Seated  Prep region:  Lumbar  Prep: Betadine    Location:  L3-4    Needle and Epidural Catheter:   Needle Type:  Tuohy  Needle Gauge:  18 G  Injection Technique:  Loss of resistance using air  Attempts:  1  Catheter Size:  20 G  Catheter at Skin Depth (cm):  14  Depth in Epidural Space (cm):  7  Events: no blood with aspiration, no cerebrospinal fluid with aspiration, no paresthesia and negative aspiration test    Test Dose:  Lidocaine 1.5% w/ epi and negative    Assessment:   Catheter Secured:  Tegaderm  Insertion:  Uncomplicated  Patient tolerance:  Patient tolerated the procedure well with no immediate complications  Epidural Catheter Placement    : Narcisa Red MD  Begin time: 750  Referring Physician: Andreas Bonner    The risks, benefits, and alternatives to epidural anesthesia were discussed with the patient. Informed Consent was obtained. A time was out performed and the correct patient and site were identified. The patient was placed in the sitting position and her back was prepped with betadine and draped in a sterile fashion. 3 mLs of 1% lidocaine was used to anesthetize the skin and subcutaneous tissue. An 18g Tuohy needle was placed midline at L3-4. Loss of resistance was obtained with air at 6.5cm. No blood, paresthesias or CSF was noted. Epidural catheter was placed via Tuohy needle and secured at 14 cm at skin, no paresthesias,blood or CSF noted. Negative test dose with 3 mLs 1.5% lidocaine w/1:200,000 epinephrine.     Ropivacaine 0.2 % bolus of 7mL with 100 mgs of fentanyl given in 3 mL increments over 10 minutes. Patient tolerated the procedure well, VSS throughout, no apparent complications. Fetal Heart Rate was monitored throughout. Empty flowsheet group. For additional vitals, please see nurses notes.      Travis Smith MD

## 2017-12-28 NOTE — H&P
History & Physical    Name: Ese Vasquez MRN: [id number]  SSN: [id number]    YOB: 1995  Age: 25 y.o. Sex: female        Subjective:     Estimated Date of Delivery: 17  OB History    Para Term  AB Living   2 0 0 0 1 0   SAB TAB Ectopic Molar Multiple Live Births   1 0 0  0       # Outcome Date GA Lbr Joaquin/2nd Weight Sex Delivery Anes PTL Lv   2 Current            1 SAB               Obstetric Comments      Patient's last menstrual period was 3/26/2016. Periods regular, last 4-5 days, flow medium, mild dysmenorrhea   History of sexually transmitted infections chlamydia and trich       Ms. Calles is admitted with pregnancy at 40w0d for induction of labor. Prenatal course was normal. Pt is taking Valtrex for HSV suppression. She denies and outbreaks or prodromal symptoms. Please see prenatal records for details. Past Medical History:   Diagnosis Date    Genital herpes      No past surgical history on file. Social History     Occupational History    Not on file. Social History Main Topics    Smoking status: Former Smoker     Packs/day: 0.25     Quit date: 2017    Smokeless tobacco: Never Used    Alcohol use No    Drug use: Yes     Special: Marijuana      Comment: smokes daily, last time was a week ago (10/9/17)    Sexual activity: Yes     Partners: Male     Birth control/ protection: None     Family History   Problem Relation Age of Onset    No Known Problems Mother     Hypertension Father     Hypertension Paternal Grandmother     Stroke Paternal Grandmother     Hypertension Paternal Grandfather        No Known Allergies  Prior to Admission medications    Medication Sig Start Date End Date Taking? Authorizing Provider   valACYclovir (VALTREX) 1 gram tablet Take 1 Tab by mouth daily.  17  Yes David Escalera MD   AMBULATORY BREAST PUMP Use as directed for breast feeding 17   Navarro Godfrey CNM   ferrous fumarate 324 mg (106 mg iron) tab Take 1 Tab by mouth two (2) times a day. 10/16/17   Navarro Daugherty CNM   AMBULATORY BREAST PUMP Use breast pump as directed for breast feeding 17   Navarro Daugherty CNM   prenatal vit-iron fumarate-fa (PRENATAL VITAMIN) 27-0.8 mg tab tablet Take 1 Tab by mouth daily. 16   Justin Winters CNM        Review of Systems: A comprehensive review of systems was negative except for that written in the HPI. Objective:     Vitals:  Vitals:    17 0915 17 0930 17 0946 17 1000   BP: 126/80 117/69 107/63 125/90   Pulse: 78 71 77 74   Resp:       Temp:       SpO2:            Physical Exam:  Patient without distress. Heart: Regular rate and rhythm  Lung: clear to auscultation throughout lung fields  Back: costovertebral angle tenderness absent  Abdomen: soft, nontender  Fundus: soft and non tender  Perineum: blood absent, amniotic fluid absent; no vulvar, vaginal or perineal lesions present  Cervical Exam: 1.5 cm dilated    Lower Extremities:  - Edema No  Membranes:  Intact  Fetal Heart Rate: Reactive  Baseline: 150sper minute  Variability: moderate  Accelerations: yes  Decelerations: none  Uterine contractions: none  EFW 7 lbs, pelvis adequate    Prenatal Labs:   Lab Results   Component Value Date/Time    ABO/Rh(D) A POSITIVE 2017 05:05 PM    Rubella, External Immune 2017    HBsAg, External negative 2017    ABO,Rh A+ 2017         Assessment/Plan:     Active Problems:    Pregnancy (2017)         Plan: Admit for induction of labor, elective. Group B Strep was negative. Cervidil/pitocin. Epidural prn. Anticipate .     Signed By:  Sanchez Velázquez MD     2017

## 2017-12-28 NOTE — PROGRESS NOTES
Labor Progress Note  Patient seen, fetal heart rate and contraction pattern evaluated, patient examined. Comfortable with epidural. Denies c/o. Patient Vitals for the past 1 hrs:   BP Pulse Resp SpO2   17 1000 125/90 74 - -   17 0946 107/63 77 - -   17 0930 117/69 71 18 -   17 0915 126/80 78 - -   17 0911 - - - 100 %       Physical Exam:  Cervical Exam:  9 cm dilated    100% effaced    -1 station    Presenting Part: cephalic  Cervical Position: anterior  Consistency: Soft  Membranes:  Artificial Rupture of Membranes; Amniotic Fluid: medium amount of clear fluid  Uterine Activity: Frequency: Every 3-5 minutes  Fetal Heart Rate: Reactive  Baseline: 150s per minute  Variability: moderate  Accelerations: yes  Decelerations: none  Uterine contractions: regular, every 3-5 minutes    Assessment/Plan:  Labor  Progressing normally, Continue plan for vaginal delivery Titrate pitocin. Anticipate .

## 2017-12-29 LAB
HCT VFR BLD AUTO: 29.2 % (ref 35–45)
HGB BLD-MCNC: 9.5 G/DL (ref 12–16)

## 2017-12-29 PROCEDURE — 85018 HEMOGLOBIN: CPT | Performed by: OBSTETRICS & GYNECOLOGY

## 2017-12-29 PROCEDURE — 74011250637 HC RX REV CODE- 250/637: Performed by: OBSTETRICS & GYNECOLOGY

## 2017-12-29 PROCEDURE — 85014 HEMATOCRIT: CPT | Performed by: OBSTETRICS & GYNECOLOGY

## 2017-12-29 PROCEDURE — 36415 COLL VENOUS BLD VENIPUNCTURE: CPT | Performed by: OBSTETRICS & GYNECOLOGY

## 2017-12-29 PROCEDURE — 65270000029 HC RM PRIVATE

## 2017-12-29 RX ORDER — LANOLIN ALCOHOL/MO/W.PET/CERES
1 CREAM (GRAM) TOPICAL
Status: DISCONTINUED | OUTPATIENT
Start: 2017-12-29 | End: 2017-12-30 | Stop reason: HOSPADM

## 2017-12-29 RX ADMIN — DOCUSATE SODIUM 100 MG: 100 CAPSULE, LIQUID FILLED ORAL at 11:38

## 2017-12-29 RX ADMIN — FERROUS SULFATE TAB 325 MG (65 MG ELEMENTAL FE) 325 MG: 325 (65 FE) TAB at 11:38

## 2017-12-29 NOTE — PROGRESS NOTES
Bedside and Verbal shift change report given to Estrellita Roberts RN by Lilia Kaufman. Venessa Newman RN . Report given with SBAR, MAR and Recent Results.      Pt resting quietly in bed, denies needs, will continue to monitor    0800 Shift assessment completed, plan of care discussed, pt denies needs    1015: Resting quietly, denies needs, denies pain, bonding with baby

## 2017-12-29 NOTE — ROUTINE PROCESS
Bedside and Verbal shift change report given to Marisol Jones RN (oncoming nurse) by Earl Martinez RN (offgoing nurse). Report included the following information Procedure Summary, MAR and Recent Results.

## 2017-12-29 NOTE — ANESTHESIA POSTPROCEDURE EVALUATION
Post-Anesthesia Evaluation and Assessment    Patient: Deanna Corley MRN: [id number]  SSN: [id number]    YOB: 1995  Age: 25 y.o. Sex: female       Cardiovascular Function/Vital Signs  Visit Vitals    /70 (BP 1 Location: Right arm, BP Patient Position: At rest)    Pulse 71    Temp 36.6 °C (97.8 °F)    Resp 18    SpO2 99%    Breastfeeding Unknown       Patient is status post epidural anesthesia for * No procedures listed *. Nausea/Vomiting: None    Postoperative hydration reviewed and adequate. Pain:  Pain Scale 1: Numeric (0 - 10) (12/29/17 0436)  Pain Intensity 1: 0 (12/29/17 0436)   Managed    Neurological Status:   Neuro (WDL): Within Defined Limits (12/28/17 0830)   At baseline  Epidural block has worn off. Mental Status and Level of Consciousness: Alert and oriented     Pulmonary Status:   O2 Device: Room air (12/28/17 1400)   Adequate oxygenation and airway patent    Complications related to anesthesia: None    Post-anesthesia assessment completed.  No concerns    Signed By: Jewell Spence CRNA     December 29, 2017

## 2017-12-29 NOTE — PROGRESS NOTES
Educated patient about making first pediatrics appointment. List of local pediatricians at bedside. Patient stated understanding. Opportunity for questions given. Educated patient on how to call the nursery.

## 2017-12-29 NOTE — ROUTINE PROCESS
Patient Rounds    0718-Patient resting, no complaints, no assistance needed  0848-Patient resting, no complaints, no assistance needed  0930-Patient breastfeeding, no complaints, no assistance needed  1050-Patient ambulating room, no complaints, no assistance needed  1225-Patient resting, no complaints, no assistance needed  1326-Patient changing baby, no complaints, no assistance needed  1412-Patient resting, no complaints, no assistance needed, family in room

## 2017-12-29 NOTE — ADT AUTH CERT NOTES
Patient Demographics        Patient Name [address] Sex  Address Phone       Anneliese Marquez [phone] Female 1995 [address] [phone] (Home)  [phone] (Mobile)           CSN:       [id number]           Admit Date: Admit Time Room Bed       Dec 27, 2017  4:03 PM 2206 [[id number]] 01 [[id number]]           Attending Providers        Provider Pager From To       Alverto Mckeon MD  17       ADM  DEL  VAG    Delivery Date: 2017   Delivery Time: 11:07 AM   Delivery Type: Vaginal, Spontaneous Delivery  Sex:  female    Gestational Age: 37w0d      Measurements:  Birth Weight: 3.137 kg    Birth Length:            Delivery Clinician:  Alverto Mckeon  Living?:   Delivery Location: L&D

## 2017-12-29 NOTE — LACTATION NOTE
This note was copied from a baby's chart. Discussed with mother her plan for feeding. Questions and concerns addressed at this time. Patient confirms breast milk and formula feed as intended feeding method at this time. Instructed on how to recognize hunger cues and offered assistance as needed. Will support her choice and offer additional information as needed. Infant observed breastfeeding in football position. Intermittent swallows heard.

## 2017-12-29 NOTE — PROGRESS NOTES
Post-Partum Day Number 1 Progress Note    Patient doing well post-partum without significant complaint. Voiding withour difficulty, normal lochia. Vitals:  Patient Vitals for the past 8 hrs:   BP Temp Pulse Resp SpO2   17 0718 139/87 97.9 °F (36.6 °C) 81 18 100 %     Temp (24hrs), Av °F (36.7 °C), Min:97.8 °F (36.6 °C), Max:98.1 °F (36.7 °C)      Vital signs stable, afebrile. Exam:  Patient without distress. Abdomen soft, fundus firm at level of umbilicus, nontender               Perineum with normal lochia noted. Lower extremities are negative for swelling, cords or tenderness. Lab/Data Review:  CBC:   Lab Results   Component Value Date/Time    HGB 9.5 (L) 2017 06:15 AM    HCT 29.2 (L) 2017 06:15 AM       Assessment and Plan:  Patient appears to be having uncomplicated post-partum course. BP stable. Continue to monitor. Continue routine perineal care and maternal education. Begin daily iron supplementation. Plan discharge tomorrow if no problems occur.

## 2017-12-30 VITALS
DIASTOLIC BLOOD PRESSURE: 88 MMHG | HEART RATE: 67 BPM | RESPIRATION RATE: 18 BRPM | TEMPERATURE: 98.1 F | SYSTOLIC BLOOD PRESSURE: 143 MMHG | WEIGHT: 229.59 LBS | BODY MASS INDEX: 36.9 KG/M2 | HEIGHT: 66 IN | OXYGEN SATURATION: 100 %

## 2017-12-30 PROCEDURE — 74011250636 HC RX REV CODE- 250/636: Performed by: OBSTETRICS & GYNECOLOGY

## 2017-12-30 PROCEDURE — 74011250637 HC RX REV CODE- 250/637: Performed by: OBSTETRICS & GYNECOLOGY

## 2017-12-30 PROCEDURE — 90715 TDAP VACCINE 7 YRS/> IM: CPT | Performed by: OBSTETRICS & GYNECOLOGY

## 2017-12-30 RX ORDER — IBUPROFEN 800 MG/1
800 TABLET ORAL
Qty: 90 TAB | Refills: 0 | Status: SHIPPED | OUTPATIENT
Start: 2017-12-30 | End: 2018-04-10

## 2017-12-30 RX ORDER — FERROUS FUMARATE 324(106)MG
1 TABLET ORAL 2 TIMES DAILY
Qty: 60 TAB | Refills: 5 | Status: ON HOLD | OUTPATIENT
Start: 2017-12-30 | End: 2019-12-13

## 2017-12-30 RX ORDER — OXYCODONE AND ACETAMINOPHEN 5; 325 MG/1; MG/1
1 TABLET ORAL
Qty: 30 TAB | Refills: 0 | Status: SHIPPED | OUTPATIENT
Start: 2017-12-30 | End: 2018-04-10

## 2017-12-30 RX ORDER — DOCUSATE SODIUM 100 MG/1
100 CAPSULE, LIQUID FILLED ORAL
Qty: 60 CAP | Refills: 0 | Status: SHIPPED | OUTPATIENT
Start: 2017-12-30 | End: 2018-03-30

## 2017-12-30 RX ORDER — IBUPROFEN 800 MG/1
800 TABLET ORAL
Qty: 90 TAB | Refills: 0 | Status: SHIPPED | OUTPATIENT
Start: 2017-12-30 | End: 2017-12-30

## 2017-12-30 RX ADMIN — TETANUS TOXOID, REDUCED DIPHTHERIA TOXOID AND ACELLULAR PERTUSSIS VACCINE, ADSORBED 0.5 ML: 5; 2.5; 8; 8; 2.5 SUSPENSION INTRAMUSCULAR at 10:18

## 2017-12-30 RX ADMIN — FERROUS SULFATE TAB 325 MG (65 MG ELEMENTAL FE) 325 MG: 325 (65 FE) TAB at 10:18

## 2017-12-30 NOTE — DISCHARGE INSTRUCTIONS
Discharge Instructions: Vaginal Delivery    Signs of Illness:  CALL YOUR PROVIDER IF ANY OF THE FOLLOWING OCCUR  1. Temperature of 100.4 or above  2. Chills  3. Severe abdominal pain  4. Excessive vaginal bleeding (more than 1 pad/hour, or any bleeding like a heavy period over the next 2 weeks)  5. Large amount of clots  6. Unusual discharge or drainage  7. Discharge with foul odor  8. Pain or burning on urination, or problems having a BM  9. Painful, reddened, tender breasts  10: Pain/ swelling/ redness in the calf of your legs. 11. Other symptoms you do not understand     Activity  1. Rest when your baby rests  2. 1-2 weeks after delivery, gradually increase your activity    General Concerns  1. Eat healthy, proper nutrition and adequate fluids are essential for healing, strength and energy. 2. Continue monthly self breast exams  3. No douching, tampons or intercourse for 6 weeks  4. No tub baths, pools, or hot tubs for 6 weeks      Follow-up  Call you provider on the day of discharge to schedule a follow-up appointment in 2 weeks. Call [phone] if you have any questions, and ask to speak with a nurse. DISCHARGE SUMMARY from Nurse    The following personal items collected during your admission are returned to you:   Dental Appliance: Dental Appliances: None  Vision:    Hearing Aid:    Jewelry: Jewelry: None  Clothing: Clothing: None  Other Valuables: Other Valuables: Cell Phone  Valuables sent to safe:                *  Please give a list of your current medications to your Primary Care Provider. *  Please update this list whenever your medications are discontinued, doses are      changed, or new medications (including over-the-counter products) are added. *  Please carry medication information at all times in case of emergency situations.       These are general instructions for a healthy lifestyle:    No smoking/ No tobacco products/ Avoid exposure to second hand smoke    Surgeon Pina Briggs Warning:  Quitting smoking now greatly reduces serious risk to your health. Obesity, smoking, and sedentary lifestyle greatly increases your risk for illness    A healthy diet, regular physical exercise & weight monitoring are important for maintaining a healthy lifestyle    You may be retaining fluid if you have a history of heart failure or if you experience any of the following symptoms:  Weight gain of 3 pounds or more overnight or 5 pounds in a week, increased swelling in our hands or feet or shortness of breath while lying flat in bed. Please call your doctor as soon as you notice any of these symptoms; do not wait until your next office visit. Recognize signs and symptoms of STROKE:    F-face looks uneven    A-arms unable to move or move unevenly    S-speech slurred or non-existent    T-time-call 911 as soon as signs and symptoms begin-DO NOT go       Back to bed or wait to see if you get better-TIME IS BRAIN. The discharge information has been reviewed with the patient. The patient verbalized understanding. Patient armband removed and shredded    Quantumhart Activation    Thank you for requesting access to [employer]. Please follow the instructions below to securely access and download your online medical record. [facility] allows you to send messages to your doctor, view your test results, renew your prescriptions, schedule appointments, and more. How Do I Sign Up? 1. In your internet browser, go to [email or web address]. 2. Click on the First Time User? Click Here link in the Sign In box. You will see the New Member Sign Up page. 3. Enter your BiocroÃ[employer]­ Access Code exactly as it appears below. You will not need to use this code after youve completed the sign-up process. If you do not sign up before the expiration date, you must request a new code. Biocro[employer]­ Access Code:  Activation code not generated  Current [facility] Status: Patient Declined (This is the date your Carlypso access code will )    4. Enter the last four digits of your Social Security Number (xxxx) and Date of Birth (mm/dd/yyyy) as indicated and click Submit. You will be taken to the next sign-up page. 5. Create a SiSenset ID. This will be your Carlypso login ID and cannot be changed, so think of one that is secure and easy to remember. 6. Create a Carlypso password. You can change your password at any time. 7. Enter your Password Reset Question and Answer. This can be used at a later time if you forget your password. 8. Enter your e-mail address. You will receive e-mail notification when new information is available in [address] 9. Click Sign Up. You can now view and download portions of your medical record. 10. Click the Download Summary menu link to download a portable copy of your medical information. Additional Information    If you have questions, please visit the Frequently Asked Questions section of the Carlypso website at [email or web address] Remember, Carlypso is NOT to be used for urgent needs. For medical emergencies, dial 911. After Your Delivery (the Postpartum Period): After Your Visit  Your Care Instructions  Congratulations on the birth of your baby. Like pregnancy, the  period can be a time of excitement, biju, and exhaustion. You may look at your wondrous little baby and feel happy. You may also be overwhelmed by your new sleep hours and new responsibilities. At first, babies often sleep during the days and are awake at night. They do not have a pattern or routine. They may make sudden gasps, jerk themselves awake, or look like they have crossed eyes. These are all normal, and they may even make you smile. In these first weeks after delivery, try to take good care of yourself. It may take 4 to 6 weeks to feel like yourself again, and possibly longer if you had a  birth. You will likely feel very tired for several weeks.  Your days will be full of ups and downs, but lots of biju as well. Follow-up care is a key part of your treatment and safety. Be sure to make and go to all appointments, and call your doctor if you are having problems. Its also a good idea to know your test results and keep a list of the medicines you take. How can you care for yourself at home? Take care of your body after delivery  · Use pads instead of tampons for the bloody flow that may last as long as 2 weeks. · Ease cramps with acetaminophen (Tylenol). · Ease soreness of hemorrhoids and the area between your vagina and rectum with ice compresses or witch hazel pads. · Ease constipation by drinking lots of fluid and eating high-fiber foods. Ask your doctor about over-the-counter stool softeners. · Cleanse yourself with a gentle squeeze of warm water from a bottle instead of wiping with toilet paper. · Take a sitz bath in warm water several times a day. · Wear a good nursing bra. Ease sore and swollen breasts with warm, wet washcloths. · If you are not breast-feeding, use ice rather than heat for breast soreness. · Your period may not start for several months if you are breast-feeding. You may bleed more, and longer at first, than you did before you got pregnant. · Wait until you are healed (about 4 to 6 weeks) before you have sexual intercourse. Your doctor will tell you when it is okay to have sex. · Try not to travel with your baby for 5 or 6 weeks. If you take a long car trip, make frequent stops to walk around and stretch. Avoid exhaustion  · Rest every day. Try to nap when your baby naps. · Ask another adult to be with you for a few days after delivery. · Plan for  if you have other children. · Stay flexible so you can eat at odd hours and sleep when you need to. Both you and your baby are making new schedules. · Plan small trips to get out of the house. Change can make you feel less tired. · Ask for help with housework, cooking, and shopping. Remind yourself that your job is to care for your baby. Know about help for postpartum depression  · \"Baby blues are common for the first 1 to 2 weeks after birth. You may cry or feel sad or irritable for no reason. · Rest whenever you can. Being tired makes it harder to handle your emotions. · Go for walks with your baby. · Talk to your partner, friends, and family about your feelings. · If your symptoms last for more than a few weeks, or if you feel very depressed, ask your doctor for help. · Postpartum depression can be treated. Support groups and counseling can help. Sometimes medicine can also help. Stay healthy  · Eat healthy foods so you have more energy, make good breast milk, and lose extra baby pounds. · If you breast-feed, avoid alcohol and drugs. Stay smoke-free. If you quit during pregnancy, congratulations. · Start daily exercise after 4 to 6 weeks, but rest when you feel tired. · Learn exercises to tone your belly. Do Kegel exercises to regain strength in your pelvic muscles. You can do these exercises while you stand or sit. ¨ Squeeze the same muscles you would use to stop your urine. Your belly and rear end (buttocks) should not move. ¨ Hold the squeeze for 3 seconds, then relax for 3 seconds. ¨ Repeat the exercise 10 to 15 times for each session. Do three or more sessions each day. · Find a class for new mothers and new babies that has an exercise time. · If you had a  birth, give yourself a bit more time before you exercise, and be careful. When should you call for help? Call 911 anytime you think you may need emergency care. For example, call if:  · You have sudden, severe pain in your belly. · You passed out (lost consciousness). Call your doctor now or seek immediate medical care if:  · You have severe vaginal bleeding. You are passing blood clots and soaking through a pad each hour for 2 or more hours.   · Your vaginal bleeding seems to be getting heavier or is still bright red 4 days after delivery, or you pass blood clots larger than the size of a golf ball. · You are dizzy or lightheaded, or you feel like you may faint. · You are vomiting or cannot keep fluids down. · You have a fever. · You have new or more belly pain. · You pass tissue (not just blood). · Your breast or breasts have hard, red, or tender areas. · You have an urgent or frequent need to urinate, along with a burning feeling. · You have severe pain, tenderness, or swelling in your vagina or the area between your rectum and vagina. · You have severe pains in your chest, belly, back, or legs. · You have feelings of severe despair or great anxiety. · Your baby is unusually cranky or is sleeping too much. · Your baby's eyes are red or have discharge. · Your baby has white patches on the roof and sides of the mouth or tongue. · Your baby's umbilical cord is foul-smelling, swollen, red, or leaking pus. · There is blood or mucus in your baby's bowel movements. · Your baby has fewer than 6 wet diapers a day. · Your baby does not want to eat, or your baby is throwing up with every feeding. · Your baby has trouble breathing. · Your baby has a rectal temperature of 100.4°F or more, or an underarm temperature over 99.4°F.  · You baby has a low temperature less than 97.5°F rectal, or less than 97. 0°F underarm. · Your baby's skin looks yellow. Watch closely for changes in your health, and be sure to contact your doctor if you have any problems. Where can you learn more? Go to mytheresa.com.be  Enter A461 in the search box to learn more about \"After Your Delivery (the Postpartum Period): After Your Visit. \"   © 8598-1386 Healthwise, Incorporated. Care instructions adapted under license by Diana Coats (which disclaims liability or warranty for this information).  This care instruction is for use with your licensed healthcare professional. If you have questions about a medical condition or this instruction, always ask your healthcare professional. [address] any warranty or liability for your use of this information. Content Version: 9.3.97159; Last Revised: July 8, 2010      Depression After Childbirth: After Your Visit  Your Care Instructions  Many women get the \"baby blues\" during the first few days after childbirth. They may lose sleep, feel irritable, cry easily, and feel happy one minute and sad the next. Hormone changes are one cause of these emotional changes. Also, the demands of a new baby, coupled with visits from relatives or other family needs, add to a mother's stress. The \"baby blues\" usually peak around the fourth day and then ease up in less than 2 weeks. If your moodiness or anxiety lasts for more than 2 weeks, or if you feel like life is not worth living, you may have postpartum depression. This is different for each mother. Some mothers with serious depression may worry intensely about their infant's well-being, while others may feel distant from their child. Some mothers might even feel that they might harm their baby. A mother may have signs of paranoia, wondering if someone is watching her. Depression is not a sign of weakness. It is a medical condition that requires treatment. Medicine and counseling are often effective at reducing depression. Talk to your doctor about taking antidepressant medicine while breast-feeding. Follow-up care is a key part of your treatment and safety. Be sure to make and go to all appointments, and call your doctor if you are having problems. Its also a good idea to know your test results and keep a list of the medicines you take. How can you care for yourself at home? · Take your medicines exactly as prescribed. Call your doctor if you think you are having a problem with your medicine. · Eat a balanced diet, so that you can keep up your energy.   · Get regular daily exercise, such as walks, to help improve your mood. · Get as much sunlight as possible. Keep your shades and curtains open, and get outside as much as you can. · Avoid using alcohol or other substances to feel better. · Get as much rest and sleep as possible, and avoid doing too much. Being too tired can increase depression. · Play stimulating music throughout your day and soothing music at night. · Schedule outings and visits with friends and family. Ask them to call you regularly, so that you do not feel alone. · Ask for help with preparing food and other daily tasks. Family and friends are often happy to help a mother with a . · Be honest with yourself and those who care about you. Tell them about your struggle. · Join a support group of new mothers. No one can better understand the challenges of caring for a  than other new mothers. When should you call for help? Call 911 anytime you think you may need emergency care. For example, call if:  · You feel you cannot stop from hurting yourself or someone else. Call your doctor now or seek immediate medical care if:  · You are having trouble caring for yourself or your baby. · You have signs of paranoia that can occur with postpartum depression. You fear that someone is watching you, stealing from you, or reading your mind. · You hear voices. · You have symptoms of postpartum depression, such as:  ¨ Sleeplessness. ¨ Anxiety. ¨ Hopelessness. ¨ Irritability. ¨ Poor concentration. · Someone you know has depression and:  ¨ Starts to give away his or her possessions. ¨ Uses illegal drugs or drinks alcohol heavily. ¨ Talks or writes about death, including writing suicide notes and talking about guns, knives, or pills. ¨ Starts to spend a lot of time alone. ¨ Acts very aggressively or suddenly appears calm. Watch closely for changes in your health, and be sure to contact your doctor if you have any problems. Where can you learn more?    Go to DealExplorer.be  Enter [id number] in the search box to learn more about \"Depression After Childbirth: After Your Visit. \"   © 6381-9720 Healthwise, Incorporated. Care instructions adapted under license by Tamy Rivers (which disclaims liability or warranty for this information). This care instruction is for use with your licensed healthcare professional. If you have questions about a medical condition or this instruction, always ask your healthcare professional. [place] [address] any warranty or liability for your use of this information. Content Version: 9.3.89957; Last Revised: April 18, 2011    Breast Self-Exam: After Your Visit  Your Care Instructions  A breast self-exam is when you check your breasts for lumps or changes. This regular exam helps you learn how your breasts normally look and feel. Most breast problems or changes are not because of cancer. Breast self-exam is not a substitute for a mammogram. Having regular breast exams by your doctor and regular mammograms improve your chances of finding any problems with your breasts. Some women set a time each month to do a step-by-step breast self-exam. Other women like a less formal system. They might look at their breasts as they brush their teeth, or feel their breasts once in a while in the shower. If you notice a change in your breast, tell your doctor. Follow-up care is a key part of your treatment and safety. Be sure to make and go to all appointments, and call your doctor if you are having problems. Its also a good idea to know your test results and keep a list of the medicines you take. How do you do a breast self-exam?  · The best time to examine your breasts is usually one week after your menstrual period begins. Your breasts should not be tender then. If you do not have periods, you might do your exam on a day of the month that is easy to remember.   · To examine your breasts:  ¨ Remove all your clothes above the waist and lie down. When you are lying down, your breast tissue spreads evenly over your chest wall, which makes it easier to feel all your breast tissue. ¨ Use the pads--not the fingertips--of the 3 middle fingers of your left hand to check your right breast. Move your fingers slowly in small coin-sized circles that overlap. ¨ Use three levels of pressure to feel of all your breast tissue. Use light pressure to feel the tissue close to the skin surface. Use medium pressure to feel a little deeper. Use firm pressure to feel your tissue close to your breastbone and ribs. Use each pressure level to feel your breast tissue before moving on to the next spot. ¨ Check your entire breast, moving up and down as if following a strip from the collarbone to the bra line, and from the armpit to the ribs. Repeat until you have covered the entire breast.  ¨ Repeat this procedure for your left breast, using the pads of the 3 middle fingers of your right hand. · To examine your breasts while in the shower:  ¨ Place one arm over your head and lightly soap your breast on that side. ¨ Using the pads of your fingers, gently move your hand over your breast (in the strip pattern described above), feeling carefully for any lumps or changes. ¨ Repeat for the other breast.  · Have your doctor inspect anything you notice to see if you need further testing. Where can you learn more? Go to NanoPowers.be  Enter P148 in the search box to learn more about \"Breast Self-Exam: After Your Visit. \"   © 1951-5381 Healthwise, Incorporated. Care instructions adapted under license by Maritza Gregg (which disclaims liability or warranty for this information).  This care instruction is for use with your licensed healthcare professional. If you have questions about a medical condition or this instruction, always ask your healthcare professional. [place] [address] any warranty or liability for your use of this information. Content Version: 9.3.17983; Last Revised: September 6, 2011   Breast-Feeding: After Your Visit  Your Care Instructions    Breast-feeding has many benefits. It may lower your baby's chances of getting an infection. It also may prevent your baby from having problems such as diabetes and high cholesterol later in life. Breast-feeding also helps you bond with your baby. The American Academy of Pediatrics recommends breast-feeding for at least a year. That may be very hard for many women to do, but breast-feeding even for a shorter period of time is a health benefit to you and your baby. In the first days after birth, your breasts make a thick, yellow liquid called colostrum. This liquid gives your baby nutrients and antibodies against infection. It is all that babies need in the first days after birth. Your breasts will fill with milk a few days after the birth. Breast-feeding is a skill that gets better with practice. It is normal to have some problems. Some women have sore or cracked nipples, blocked milk ducts, or a breast infection (mastitis). But if you feed your baby every 1 to 2 hours during the day and use good breast-feeding methods, you may not have these problems. You can treat these problems if they happen and continue breast-feeding. Follow-up care is a key part of your treatment and safety. Be sure to make and go to all appointments, and call your doctor if you are having problems. Its also a good idea to know your test results and keep a list of the medicines you take. How can you care for yourself at home? · Breast-feed your baby whenever he or she is hungry. In the first 2 weeks, your baby will feed about every 1 to 3 hours. This will help you keep up your supply of milk. · Put a bed pillow or a nursing pillow on your lap to support your arms and your baby. · Hold your baby in a comfortable position. ¨ You can hold your baby in several ways.  One of the most common positions is the cradle hold. One arm supports your baby, with his or her head in the bend of your elbow. Your open hand supports your baby's bottom or back. Your baby's belly lies against yours. ¨ If you had your baby by , or , try the football hold. This position keeps your baby off your belly. Tuck your baby under your arm, with his or her body along the side you will be feeding on. Support your baby's upper body with your arm. With that hand you can control your baby's head to bring his or her mouth to your breast.  ¨ Try different positions with each feeding. If you are having problems, ask for help from your doctor or a lactation consultant. · To get your baby to latch on:  ¨ Support and narrow your breast with one hand using a \"U hold,\" with your thumb on the outer side of your breast and your fingers on the inner side. You can also use a \"C hold,\" with all your fingers below the nipple and your thumb above it. Try the different holds to get the deepest latch for whichever breast-feeding position you use. Your other arm is behind your baby's back, with your hand supporting the base of the baby's head. Position your fingers and thumb to point toward your baby's ears. ¨ You can touch your baby's lower lip with your nipple to get your baby to open his or her mouth. Wait until your baby opens up really wide, like a big yawn. Then be sure to bring the baby quickly to your breast--not your breast to the baby. As you bring your baby toward your breast, use your other hand to support the breast and guide it into his or her mouth. ¨ Both the nipple and a large portion of the darker area around the nipple (areola) should be in the baby's mouth. The baby's lips should be flared outward, not folded in (inverted). ¨ Listen for a regular sucking and swallowing pattern while the baby is feeding. If you cannot see or hear a swallowing pattern, watch the baby's ears, which will wiggle slightly when the baby swallows.  If the baby's nose appears to be blocked by your breast, tilt the baby's head back slightly, so just the edge of one nostril is clear for breathing. ¨ When your baby is latched, you can usually remove your hand from supporting your breast and bring it under your baby to cradle him or her. Now just relax and breast-feed your baby. · You will know that your baby is feeding well when:  ¨ His or her mouth covers a lot of the areola, and the lips are flared out. ¨ His or her chin and nose rest against your breast.  ¨ Sucking is deep and rhythmic, with short pauses. ¨ You are able to see and hear your baby swallowing. ¨ You do not feel pain in your nipple. · If your baby takes only one breast at a feeding, start the next feeding on the other breast.  · Anytime you need to remove your baby from the breast, put one finger in the corner of his or her mouth. Push your finger between your baby's gums to gently break the seal. If you do not break the tight seal before you remove your baby, your nipples can become sore, cracked, or bruised. · After feeding your baby, gently pat his or her back to let out any swallowed air. After your baby burps, offer the breast again, or offer the other breast. Sometimes a baby will want to keep feeding after being burped. When should you call for help? Call your doctor now or seek immediate medical care if:  · You have problems with breast-feeding, such as:  ¨ Sore, red nipples. ¨ Stabbing or burning breast pain. ¨ A hard lump in your breast.  ¨ A fever, chills, or flu-like symptoms. Watch closely for changes in your health, and be sure to contact your doctor if:  · Your baby has trouble latching on to your breast.  · You continue to have pain or discomfort when breast-feeding. · Your baby wets fewer than 4 diapers a day. · You have other questions or concerns. Where can you learn more?    Go to [employer]  Enter P492 in the search box to learn more about \"Breast-Feeding: After Your Visit. \"   © 1963-7751 Healthwise, Incorporated. Care instructions adapted under license by Beatriz Lundy (which disclaims liability or warranty for this information). This care instruction is for use with your licensed healthcare professional. If you have questions about a medical condition or this instruction, always ask your healthcare professional. [address] any warranty or liability for your use of this information.   Content Version: 9.3.61610; Last Revised: February 10, 2012

## 2017-12-30 NOTE — ROUTINE PROCESS
1915 Bedside and Verbal shift change report given to Claudene Limb, RN   (oncoming nurse) by Bimal Leonardo  (offgoing nurse). Report included the following information SBAR and Kardex.

## 2017-12-30 NOTE — PROGRESS NOTES
SHIFT SUMMARY REPORT [id number]:    0710: Verbal and bedside report received from offgoing RN, VALERIY Brooks, using SBAR, Kardex, and [facility].    0825: Initial shift assessment completed. Patient denies need for pain m,eds at this time. Consented to Tdap vaccine. 0930: Resting in bed, holding baby. EPDS completed=2.    1018: Tdap vaccine given. 1130: Resting in bed, no requests. 1205: DC instructions reviewed with patient, she verbalizes understanding. 1215: DC with baby in stable condition.

## 2017-12-30 NOTE — DISCHARGE SUMMARY
Obstetrical Discharge Summary     Name: Madeline Osborn MRN: [id number]  SSN: [id number]    YOB: 1995  Age: 25 y.o. Sex: female      Admit Date: 2017    Discharge Date: 2017     Admitting Physician: Otilio Crum MD     Attending Physician:  Otilio Crum MD     * Admission Diagnoses: inductions;Pregnancy;Pregnancy    * Discharge Diagnoses:   Information for the patient's :  [facility]phan[facility] Alejandra [facility] [[id number]]   Delivery of a 3.137 kg female infant via Vaginal, Spontaneous Delivery on 2017 at 11:07 AM  by . Apgars were 8 and 9. Additional Diagnoses:   Hospital Problems as of 2017  Date Reviewed: 2017          Codes Class Noted - Resolved POA    Pregnancy ICD-10-CM: Z34.90  ICD-9-CM: V22.2  2017 - Present Unknown             Lab Results   Component Value Date/Time    ABO/Rh(D) A POSITIVE 2017 05:05 PM    Rubella, External Immune 2017    ABO,Rh A+ 2017      Immunization History   Administered Date(s) Administered    Tdap 2017       * Procedures: , Epidural  * No surgery found *      [place]  Depression Scale  I have been able to laugh and see the funny side of things: As much as I always could  I have looked forward with enjoyment to things: As much as I ever did  I have blamed myself unnecessarily when things went wrong: No, never  I have been anxious or worried for no good reason: Hardly ever  I have felt scared or panicky for no very good reason: No, not much  Things have been getting on top of me: No, I have been coping as well as ever  I have been so unhappy that I have had difficulty sleeping: No, not at all  I have felt sad or miserable: No, not at all  I have been so unhappy that I have been crying: No, never  The thought of harming myself has occurred to me: Never  Total Score: 2    * Discharge Condition: good    * Hospital Course: Normal hospital course following the delivery.     * Disposition: Home    Discharge Medications:   Current Discharge Medication List      START taking these medications    Details   docusate sodium (COLACE) 100 mg capsule Take 1 Cap by mouth two (2) times daily as needed for Constipation for up to 90 days. Qty: 60 Cap, Refills: 0      oxyCODONE-acetaminophen (PERCOCET) 5-325 mg per tablet Take 1 Tab by mouth every four (4) hours as needed. Max Daily Amount: 6 Tabs. Qty: 30 Tab, Refills: 0    Associated Diagnoses: 40 weeks gestation of pregnancy      ibuprofen (MOTRIN) 800 mg tablet Take 1 Tab by mouth every eight (8) hours as needed. Qty: 90 Tab, Refills: 0         CONTINUE these medications which have CHANGED    Details   ferrous fumarate 324 mg (106 mg iron) tab Take 1 Tab by mouth two (2) times a day. Qty: 60 Tab, Refills: 5    Associated Diagnoses: Iron deficiency anemia during pregnancy, antepartum         CONTINUE these medications which have NOT CHANGED    Details   !! AMBULATORY BREAST PUMP Use as directed for breast feeding  Qty: 1 Pump(s), Refills: 0    Associated Diagnoses: Encounter for breast feeding counseling      !! AMBULATORY BREAST PUMP Use breast pump as directed for breast feeding  Qty: 1 Pump(s), Refills: 0    Associated Diagnoses: Encounter for breast feeding counseling      prenatal vit-iron fumarate-fa (PRENATAL VITAMIN) 27-0.8 mg tab tablet Take 1 Tab by mouth daily. Qty: 60 Tab, Refills: 5    Associated Diagnoses: Encounter for supervision of normal first pregnancy in first trimester       !! - Potential duplicate medications found. Please discuss with provider. STOP taking these medications       valACYclovir (VALTREX) 1 gram tablet Comments:   Reason for Stopping:               * Follow-up Care/Patient Instructions:   Activity: Activity as tolerated and No sex for 6 weeks  Diet: Regular Diet  Wound Care: As directed    Follow-up Information     Follow up With Details Comments Contact Info    Beryle Barrack, [address] [facility]  [address] [place], [place] [id number]  [phone]             Signed By:  Lennox Rink, MD     December 30, 2017

## 2017-12-30 NOTE — ROUTINE PROCESS
Patient Rounds    0723-Patient resting, no complaints, no assistance needed  0825-RN in room  0940-Patient ambulating room, no complaints, no assistance needed  1055- in room  1135-RN going over patients discharge instructions  1215-Patient discharged

## 2018-01-31 ENCOUNTER — ROUTINE PRENATAL (OUTPATIENT)
Dept: OBGYN CLINIC | Age: 23
End: 2018-01-31

## 2018-01-31 VITALS
RESPIRATION RATE: 16 BRPM | TEMPERATURE: 97.6 F | HEIGHT: 66 IN | OXYGEN SATURATION: 100 % | WEIGHT: 229 LBS | HEART RATE: 83 BPM | SYSTOLIC BLOOD PRESSURE: 132 MMHG | BODY MASS INDEX: 36.8 KG/M2 | DIASTOLIC BLOOD PRESSURE: 80 MMHG

## 2018-01-31 DIAGNOSIS — L29.2 VULVAR ITCHING: ICD-10-CM

## 2018-01-31 RX ORDER — VALACYCLOVIR HYDROCHLORIDE 1 G/1
TABLET, FILM COATED ORAL AS NEEDED
COMMUNITY

## 2018-01-31 RX ORDER — NYSTATIN 100000 U/G
CREAM TOPICAL
Qty: 15 G | Refills: 0 | Status: SHIPPED | OUTPATIENT
Start: 2018-01-31 | End: 2018-09-28 | Stop reason: ALTCHOICE

## 2018-01-31 NOTE — PATIENT INSTRUCTIONS
Nutrition for Breastfeeding Mothers: Care Instructions  Your Care Instructions    When a woman breastfeeds her baby, she needs more nutrients to keep herself healthy and to make the baby's milk. Breastfeeding helps build the bond between you and your baby. It gives your baby excellent health benefits. A healthy diet includes eating a variety of foods from the basic food groups: grains, vegetables, fruits, milk and milk products (such as cheese and yogurt), and meat and dried beans. Eating well during breastfeeding will ensure that you stay healthy and your baby grows and develops normally. Follow-up care is a key part of your treatment and safety. Be sure to make and go to all appointments, and call your doctor if you are having problems. It's also a good idea to know your test results and keep a list of the medicines you take. How can you care for yourself at home? · Include 3 to 4 cups of nonfat or low-fat milk or milk products in your diet every day. These include:  ¨ Milk (8 ounces equals 1 cup). ¨ Ice cream (1½ cups equals 1 cup of milk). ¨ Cheese (1½ ounces of cheese equals 1 cup). ¨ Yogurt (8 ounces equals 1 cup). · Eat at least 7 ounces of grains, such as cereals, breads, crackers, rice, or pasta, every day. One ounce is about 1 slice of bread, 1 cup of breakfast cereal, or ½ cup of cooked rice, cereal, or pasta. · Eat 3 cups of vegetables each day. Choices include:  ¨ Dark-green vegetables such as broccoli and spinach. ¨ Orange vegetables such as carrots and sweet potatoes. ¨ Dried beans (such as farias and kidney beans) and peas (such as lentils). · Every day, eat 2 cups of fresh, frozen, or canned fruit. · Eat 6½ ounces each day of protein, such as chicken, fish, lean meat, eggs, peanut butter, dried beans and peas, nuts, and seeds. One egg, 1 tablespoon of peanut butter, or ½ ounce of nuts or seeds equals 1 ounce of protein. A ½ cup of cooked beans equals 2 ounces of protein.   · Drink plenty of fluids, enough so that your urine is light yellow or clear like water. If you have kidney, heart, or liver disease and have to limit fluids, talk with your doctor before you increase the amount of fluids you drink. · Limit caffeine products, such as coffee, tea, chocolate, and some sodas. Caffeine can pass to your baby through breast milk. It may cause fussiness and sleep problems in babies. · Your doctor may recommend a vitamin supplement. Take it as recommended. · Consider joining a breastfeeding support group. These are offered at many hospitals and birthing centers by nurses, nurse-midwives, or lactation consultants. When should you call for help? Watch closely for changes in your health, and be sure to contact your doctor if you have any problems. Where can you learn more? Go to http://venecia-trudy.info/. Enter P234 in the search box to learn more about \"Nutrition for Breastfeeding Mothers: Care Instructions. \"  Current as of: March 16, 2017  Content Version: 11.4  © 2536-2525 [employer]. Care instructions adapted under license by [employer] (which disclaims liability or warranty for this information). If you have questions about a medical condition or this instruction, always ask your healthcare professional. [address] any warranty or liability for your use of this information.

## 2018-01-31 NOTE — PROGRESS NOTES
4Subjective:   Ms. Meliza Hilliard is a 25 y.o. who is now 4 weeks postpartum. OB History      Para Term  AB Living    2 1 1 0 1 1    SAB TAB Ectopic Molar Multiple Live Births    1 0 0  0 1        Obstetric Comments      Patient's last menstrual period was 3/26/2016. Periods regular, last 4-5 days, flow medium, mild dysmenorrhea  History of sexually transmitted infections chlamydia and trich          Method of delivery: normal spontaneous vaginal delivery  She is breast-feeding and is not experiencing problems. Pregnancy complications: HSV 2. She is feeling well and happy. EPDS 1. States she has a lot of family support. Denies s/s of postpartum depression. She currently uses no method for contraception. She plans to use oral progesterone-only contraceptive for contraception. Patient Active Problem List   Diagnosis Code    Pregnancy Z34.90     Current Outpatient Prescriptions   Medication Sig Dispense Refill    nystatin (MYCOSTATIN) topical cream Apply  to affected area two (2) times daily as needed for Skin Irritation. 15 g 0    ferrous fumarate 324 mg (106 mg iron) tab Take 1 Tab by mouth two (2) times a day. 60 Tab 5    docusate sodium (COLACE) 100 mg capsule Take 1 Cap by mouth two (2) times daily as needed for Constipation for up to 90 days. 60 Cap 0    ibuprofen (MOTRIN) 800 mg tablet Take 1 Tab by mouth every eight (8) hours as needed. 90 Tab 0    AMBULATORY BREAST PUMP Use breast pump as directed for breast feeding 1 Pump(s) 0    prenatal vit-iron fumarate-fa (PRENATAL VITAMIN) 27-0.8 mg tab tablet Take 1 Tab by mouth daily. 60 Tab 5    valACYclovir (VALTREX) 1 gram tablet Take  by mouth.  oxyCODONE-acetaminophen (PERCOCET) 5-325 mg per tablet Take 1 Tab by mouth every four (4) hours as needed. Max Daily Amount: 6 Tabs.  30 Tab 0    AMBULATORY BREAST PUMP Use as directed for breast feeding 1 Pump(s) 0     No Known Allergies  Past Medical History:   Diagnosis Date    Genital herpes         Objective:   Physical Exam:  Physical Exam: GENERAL APPEARANCE: alert, well appearing, in no apparent distress, oriented to person, place and time  BREASTS: lactating  LUNGS: clear to auscultation, no wheezes, rales or rhonchi, symmetric air entry  HEART: regular rate and rhythm, no murmurs  ABDOMEN POSTPARTUM: benign non-tender, without masses or organomegaly palpable  SKIN: normal coloration and turgor, no rashes, no suspicious skin lesions noted    Assessment/Plan:   normal postpartum exam  See orders and Patient Instructions  Contraceptive counseling for oral progesterone-only contraceptive   RTC in 2 weeks for postpartum exam at 6 weeks    ICD-10-CM ICD-9-CM    1. Postpartum follow-up Z39.2 V24.2    2.  Vulvar itching L29.2 698.1 nystatin (MYCOSTATIN) topical cream

## 2018-04-10 ENCOUNTER — HOSPITAL ENCOUNTER (EMERGENCY)
Age: 23
Discharge: HOME | End: 2018-04-10
Attending: EMERGENCY MEDICINE
Payer: SELF-PAY

## 2018-04-10 VITALS
RESPIRATION RATE: 16 BRPM | TEMPERATURE: 97.9 F | HEART RATE: 66 BPM | WEIGHT: 235 LBS | SYSTOLIC BLOOD PRESSURE: 122 MMHG | OXYGEN SATURATION: 100 % | DIASTOLIC BLOOD PRESSURE: 79 MMHG | BODY MASS INDEX: 37.93 KG/M2

## 2018-04-10 DIAGNOSIS — N76.0 BV (BACTERIAL VAGINOSIS): Primary | ICD-10-CM

## 2018-04-10 DIAGNOSIS — Z11.3 ROUTINE SCREENING FOR STI (SEXUALLY TRANSMITTED INFECTION): ICD-10-CM

## 2018-04-10 DIAGNOSIS — B96.89 BV (BACTERIAL VAGINOSIS): Primary | ICD-10-CM

## 2018-04-10 LAB
APPEARANCE UR: ABNORMAL
BACTERIA URNS QL MICRO: ABNORMAL /HPF
BILIRUB UR QL: NEGATIVE
COLOR UR: YELLOW
EPITH CASTS URNS QL MICRO: ABNORMAL /LPF (ref 0–5)
GLUCOSE UR STRIP.AUTO-MCNC: NEGATIVE MG/DL
HCG UR QL: NEGATIVE
HGB UR QL STRIP: NEGATIVE
KETONES UR QL STRIP.AUTO: NEGATIVE MG/DL
LEUKOCYTE ESTERASE UR QL STRIP.AUTO: ABNORMAL
NITRITE UR QL STRIP.AUTO: NEGATIVE
PH UR STRIP: 8.5 [PH] (ref 5–8)
PROT UR STRIP-MCNC: NEGATIVE MG/DL
RBC #/AREA URNS HPF: ABNORMAL /HPF (ref 0–5)
SERVICE CMNT-IMP: NORMAL
SP GR UR REFRACTOMETRY: 1.02 (ref 1–1.03)
UROBILINOGEN UR QL STRIP.AUTO: 0.2 EU/DL (ref 0.2–1)
WBC URNS QL MICRO: ABNORMAL /HPF (ref 0–4)
WET PREP GENITAL: NORMAL

## 2018-04-10 PROCEDURE — 81001 URINALYSIS AUTO W/SCOPE: CPT | Performed by: PHYSICIAN ASSISTANT

## 2018-04-10 PROCEDURE — 96372 THER/PROPH/DIAG INJ SC/IM: CPT

## 2018-04-10 PROCEDURE — 87491 CHLMYD TRACH DNA AMP PROBE: CPT | Performed by: PHYSICIAN ASSISTANT

## 2018-04-10 PROCEDURE — 74011250637 HC RX REV CODE- 250/637: Performed by: PHYSICIAN ASSISTANT

## 2018-04-10 PROCEDURE — 81025 URINE PREGNANCY TEST: CPT | Performed by: PHYSICIAN ASSISTANT

## 2018-04-10 PROCEDURE — 87210 SMEAR WET MOUNT SALINE/INK: CPT | Performed by: PHYSICIAN ASSISTANT

## 2018-04-10 PROCEDURE — 74011250636 HC RX REV CODE- 250/636: Performed by: PHYSICIAN ASSISTANT

## 2018-04-10 PROCEDURE — 74011000250 HC RX REV CODE- 250

## 2018-04-10 PROCEDURE — 99284 EMERGENCY DEPT VISIT MOD MDM: CPT

## 2018-04-10 RX ORDER — METRONIDAZOLE 500 MG/1
500 TABLET ORAL 2 TIMES DAILY
Qty: 14 TAB | Refills: 0 | Status: SHIPPED | OUTPATIENT
Start: 2018-04-10 | End: 2018-04-17

## 2018-04-10 RX ORDER — AZITHROMYCIN 250 MG/1
1000 TABLET, FILM COATED ORAL
Status: COMPLETED | OUTPATIENT
Start: 2018-04-10 | End: 2018-04-10

## 2018-04-10 RX ORDER — LIDOCAINE HYDROCHLORIDE 10 MG/ML
INJECTION, SOLUTION EPIDURAL; INFILTRATION; INTRACAUDAL; PERINEURAL
Status: COMPLETED
Start: 2018-04-10 | End: 2018-04-10

## 2018-04-10 RX ORDER — CEFTRIAXONE 250 MG/8ML
250 INJECTION, POWDER, FOR SOLUTION INTRAMUSCULAR; INTRAVENOUS
Status: COMPLETED | OUTPATIENT
Start: 2018-04-10 | End: 2018-04-10

## 2018-04-10 RX ADMIN — LIDOCAINE HYDROCHLORIDE: 10 INJECTION, SOLUTION EPIDURAL; INFILTRATION; INTRACAUDAL; PERINEURAL at 13:22

## 2018-04-10 RX ADMIN — AZITHROMYCIN 1000 MG: 250 TABLET, FILM COATED ORAL at 13:22

## 2018-04-10 RX ADMIN — CEFTRIAXONE 250 MG: 250 INJECTION, POWDER, FOR SOLUTION INTRAMUSCULAR; INTRAVENOUS at 13:22

## 2018-04-10 NOTE — DISCHARGE INSTRUCTIONS
Bacterial Vaginosis: Care Instructions  Your Care Instructions    Bacterial vaginosis is a type of vaginal infection. It is caused by excess growth of certain bacteria that are normally found in the vagina. Symptoms can include itching, swelling, pain when you urinate or have sex, and a gray or yellow discharge with a \"fishy\" odor. It is not considered an infection that is spread through sexual contact. Although symptoms can be annoying and uncomfortable, bacterial vaginosis does not usually cause other health problems. However, if you have it while you are pregnant, it can cause complications. While the infection may go away on its own, most doctors use antibiotics to treat it. You may have been prescribed pills or vaginal cream. With treatment, bacterial vaginosis usually clears up in 5 to 7 days. Follow-up care is a key part of your treatment and safety. Be sure to make and go to all appointments, and call your doctor if you are having problems. It's also a good idea to know your test results and keep a list of the medicines you take. How can you care for yourself at home? · Take your antibiotics as directed. Do not stop taking them just because you feel better. You need to take the full course of antibiotics. · Do not eat or drink anything that contains alcohol if you are taking metronidazole (Flagyl). · Keep using your medicine if you start your period. Use pads instead of tampons while using a vaginal cream or suppository. Tampons can absorb the medicine. · Wear loose cotton clothing. Do not wear nylon and other materials that hold body heat and moisture close to the skin. · Do not scratch. Relieve itching with a cold pack or a cool bath. · Do not wash your vaginal area more than once a day. Use plain water or a mild, unscented soap. Do not douche. When should you call for help?   Watch closely for changes in your health, and be sure to contact your doctor if:  ? · You have unexpected vaginal bleeding. ? · You have a fever. ? · You have new or increased pain in your vagina or pelvis. ? · You are not getting better after 1 week. ? · Your symptoms return after you finish the course of your medicine. Where can you learn more? Go to http://venecia-trudy.info/. Kiersten Alicia in the search box to learn more about \"Bacterial Vaginosis: Care Instructions. \"  Current as of: October 13, 2016  Content Version: 11.4  © 7474-0784 [employer]. Care instructions adapted under license by [employer] (which disclaims liability or warranty for this information). If you have questions about a medical condition or this instruction, always ask your healthcare professional. [address] any warranty or liability for your use of this information. Exposure to Sexually Transmitted Infections: Care Instructions  Your Care Instructions    Sexually transmitted infections (STIs) are those diseases spread by sexual contact. There are at least 20 different STIs, including chlamydia, gonorrhea, syphilis, and human immunodeficiency virus (HIV), which causes AIDS. Bacteria-caused STIs can be treated and cured. STIs caused by viruses, such as HIV, can be treated but not cured. Some STIs can reduce a woman's chances of getting pregnant in the future. STIs are spread during sexual contact, such as vaginal intercourse and oral or anal sex. Follow-up care is a key part of your treatment and safety. Be sure to make and go to all appointments, and call your doctor if you are having problems. It's also a good idea to know your test results and keep a list of the medicines you take. How can you care for yourself at home? · Your doctor may have given you a shot of antibiotics. If your doctor prescribed antibiotic pills, take them as directed. Do not stop taking them just because you feel better. You need to take the full course of antibiotics.   · Do not have sexual contact while you have symptoms of an STI or are being treated for an STI. · Tell your sex partner (or partners) that he or she will need treatment. · If you are a woman, do not douche. Douching changes the normal balance of bacteria in the vagina and may spread an infection up into your reproductive organs. To prevent exposure to STIs in the future  · Use latex condoms every time you have sex. Use them from the beginning to the end of sexual contact. · Talk to your partner before you have sex. Find out if he or she has or is at risk for any STI. Keep in mind that a person may be able to spread an STI even if he or she does not have symptoms. · Do not have sex if you are being treated for an STI. · Do not have sex with anyone who has symptoms of an STI, such as sores on the genitals or mouth. · Having one sex partner (who does not have STIs and does not have sex with anyone else) is a good way to avoid STIs. When should you call for help? Call your doctor now or seek immediate medical care if:  ? · You have new pain in your belly or pelvis. ? · You have symptoms of a urinary tract infection. These may include:  ¨ Pain or burning when you urinate. ¨ A frequent need to urinate without being able to pass much urine. ¨ Pain in the flank, which is just below the rib cage and above the waist on either side of the back. ¨ Blood in your urine. ¨ A fever. ? · You have new or worsening pain or swelling in the scrotum. ? Watch closely for changes in your health, and be sure to contact your doctor if:  ? · You have unusual vaginal bleeding. ? · You have a discharge from the vagina or penis. ? · You have any new symptoms, such as sores, bumps, rashes, blisters, or warts. ? · You have itching, tingling, pain, or burning in the genital or anal area. ? · You think you may have an STI. Where can you learn more? Go to http://venecia-trudy.info/.   Enter U939 in the search box to learn more about \"Exposure to Sexually Transmitted Infections: Care Instructions. \"  Current as of: March 20, 2017  Content Version: 11.4  © 2740-9677 Healthwise, [employer]. Care instructions adapted under license by [employer] (which disclaims liability or warranty for this information). If you have questions about a medical condition or this instruction, always ask your healthcare professional. [address] any warranty or liability for your use of this information.

## 2018-04-10 NOTE — ED PROVIDER NOTES
EMERGENCY DEPARTMENT HISTORY AND PHYSICAL EXAM    11:55 AM      Date: 4/10/2018  [facility]    History of Presenting Illness     Chief Complaint   Patient presents with    Abdominal Pain         History Provided By: Patient    Chief Complaint: Vaginal discharge   Duration: 2 Weeks  Timing:  Constant  Location: Genital  Quality: \"white\"  Severity: N/A  Modifying Factors: No alleviating or exacerbating factors reported  Associated Symptoms: vaginal spotting       Additional History (Context): Isabelle Marin is a 25 y.o. female with a pertinent history of herpes type II, BV, STD (chlamydia at age 25), presenting to the ED c/o \"white\" vaginal discharge with associated vaginal spotting x 2 weeks. Pt denies dysuria, hematuria, abd pain. States she is sexually active. No new sexual partners. No other acute symptoms or complaints were noted. PCP: Ladarius Castro CNM    Current Facility-Administered Medications   Medication Dose Route Frequency Provider Last Rate Last Dose    cefTRIAXone (ROCEPHIN) injection 250 mg  250 mg IntraMUSCular NOW Elvi Royal        azithromycin (ZITHROMAX) tablet 1,000 mg  1,000 mg Oral NOW Elvi Royal         Current Outpatient Prescriptions   Medication Sig Dispense Refill    metroNIDAZOLE (FLAGYL) 500 mg tablet Take 1 Tab by mouth two (2) times a day for 7 days. 14 Tab 0    valACYclovir (VALTREX) 1 gram tablet Take  by mouth.  nystatin (MYCOSTATIN) topical cream Apply  to affected area two (2) times daily as needed for Skin Irritation. 15 g 0    ferrous fumarate 324 mg (106 mg iron) tab Take 1 Tab by mouth two (2) times a day. 60 Tab 5    AMBULATORY BREAST PUMP Use as directed for breast feeding 1 Pump(s) 0    AMBULATORY BREAST PUMP Use breast pump as directed for breast feeding 1 Pump(s) 0    prenatal vit-iron fumarate-fa (PRENATAL VITAMIN) 27-0.8 mg tab tablet Take 1 Tab by mouth daily.  60 Tab 5       Past History     Past Medical History:  Past Medical History:   Diagnosis Date    Genital herpes        Past Surgical History:  History reviewed. No pertinent surgical history. Family History:  Family History   Problem Relation Age of Onset    No Known Problems Mother     Hypertension Father     Hypertension Paternal Grandmother     Stroke Paternal Grandmother     Hypertension Paternal Grandfather        Social History:  Social History   Substance Use Topics    Smoking status: Former Smoker     Packs/day: 0.25     Quit date: 9/16/2017    Smokeless tobacco: Never Used    Alcohol use No       Allergies:  No Known Allergies      Review of Systems       Review of Systems   Constitutional: Negative for chills and fever. Respiratory: Negative for shortness of breath. Cardiovascular: Negative for chest pain. Gastrointestinal: Negative for abdominal pain, nausea and vomiting. Genitourinary: Positive for vaginal discharge. Negative for dysuria and hematuria. Vaginal spotting    Skin: Negative for rash. Neurological: Negative for weakness. All other systems reviewed and are negative. Physical Exam     Visit Vitals    /79 (BP 1 Location: Left arm, BP Patient Position: At rest)    Pulse 66    Temp 97.9 °F (36.6 °C)    Resp 16    Wt 106.6 kg (235 lb)    SpO2 100%    BMI 37.93 kg/m2         Physical Exam   Constitutional: She appears well-developed and well-nourished. No distress. HENT:   Head: Normocephalic and atraumatic. Neck: Normal range of motion. Neck supple. Cardiovascular: Normal rate, regular rhythm and normal heart sounds. Exam reveals no gallop and no friction rub. No murmur heard. Pulmonary/Chest: Effort normal and breath sounds normal. No respiratory distress. She has no wheezes. She has no rales. Abdominal: Soft. Bowel sounds are normal. She exhibits no distension. There is no tenderness. There is no rebound and no guarding.    No CVA tenderness Genitourinary:   Genitourinary Comments: See pelvic procedure    Neurological: She is alert. Skin: Skin is warm. No rash noted. She is not diaphoretic. Nursing note and vitals reviewed. Diagnostic Study Results     Labs -  Recent Results (from the past 12 hour(s))   URINALYSIS W/ RFLX MICROSCOPIC    Collection Time: 04/10/18 12:01 PM   Result Value Ref Range    Color YELLOW      Appearance CLOUDY      Specific gravity 1.019 1.005 - 1.030      pH (UA) 8.5 (H) 5.0 - 8.0      Protein NEGATIVE  NEG mg/dL    Glucose NEGATIVE  NEG mg/dL    Ketone NEGATIVE  NEG mg/dL    Bilirubin NEGATIVE  NEG      Blood NEGATIVE  NEG      Urobilinogen 0.2 0.2 - 1.0 EU/dL    Nitrites NEGATIVE  NEG      Leukocyte Esterase SMALL (A) NEG     HCG URINE, QL    Collection Time: 04/10/18 12:01 PM   Result Value Ref Range    HCG urine, QL NEGATIVE  NEG     URINE MICROSCOPIC ONLY    Collection Time: 04/10/18 12:01 PM   Result Value Ref Range    WBC 1 to 4 0 - 4 /hpf    RBC NONE 0 - 5 /hpf    Epithelial cells 3+ 0 - 5 /lpf    Bacteria FEW (A) NEG /hpf   WET PREP    Collection Time: 04/10/18 12:47 PM   Result Value Ref Range    Special Requests: NO SPECIAL REQUESTS      Wet prep FEW  CLUE CELLS PRESENT        Wet prep NO TRICHOMONAS SEEN      Wet prep NO YEAST SEEN         Radiologic Studies -   No orders to display         Medical Decision Making   I am the first provider for this patient. I reviewed the vital signs, available nursing notes, past medical history, past surgical history, family history and social history. Vital Signs-Reviewed the patient's vital signs. Pulse Oximetry Analysis -  100% on room air (Interpretation) nml    Records Reviewed: Nursing Notes (Time of Review: 11:55 AM)    ED Course: Progress Notes, Reevaluation, and Consults:  1:03 PM: Reviewed results with patient.  Discussed need for close outpatient follow-up with gynecologist. Requesting empiric treatment for CT/NG today     Provider Notes (Medical Decision Making): 26 yo F who presents due to vaginal discharge and spotting x 2 weeks. Abdomen non-tender to palpation. Pelvic examination with minimal discharge, erythematous cervical os. No CMT or adnexal tenderness. Wet mount positive for bacterial vaginosis. Covered empirically for CT/NG. Discussed importance of outpatient follow-up with gynecologist for pap smear. Stable for d/c with outpatient follow-up. Procedures: Pelvic Exam  Date/Time: 4/10/2018 11:57 AM  Performed by: PA  Procedure duration:  5 minutes. Exam assisted by:  Nurse Bennett Loabto. Type of exam performed: speculum. External genitalia appearance: normal.    Vaginal exam:  discharge. The amount of discharge was:  mild. The discharge was mucoid. Cervix assessment: erythemtatous os. Specimen(s) collected:  chlamydia, GC and vaginal culture. Bimanual exam:  normal.    Patient tolerance: Patient tolerated the procedure well with no immediate complications  Comments: Discussed need for close outpatient follow-up for erythematous cervical os, non-friable, no bleeding                Diagnosis     Clinical Impression:   1. BV (bacterial vaginosis)    2. Routine screening for STI (sexually transmitted infection)        Disposition: Discharged     Follow-up Information     Follow up With Details Comments Contact Info    [facility] EM[facility]GENCY [facility]  If symptoms worsen [address] [place] [address] [address] [place] [address] In 2 days  [address] [place] [address]             Patient's Medications   Start Taking    METRONIDAZOLE (FLAGYL) 500 MG TABLET    Take 1 Tab by mouth two (2) times a day for 7 days. Continue Taking    AMBULATORY BREAST PUMP    Use breast pump as directed for breast feeding    AMBULATORY BREAST PUMP    Use as directed for breast feeding    FERROUS FUMARATE 324 MG (106 MG IRON) TAB    Take 1 Tab by mouth two (2) times a day.     NYSTATIN (MYCOSTATIN) TOPICAL CREAM Apply  to affected area two (2) times daily as needed for Skin Irritation. PRENATAL VIT-IRON FUMARATE-FA (PRENATAL VITAMIN) 27-0.8 MG TAB TABLET    Take 1 Tab by mouth daily. VALACYCLOVIR (VALTREX) 1 GRAM TABLET    Take  by mouth. These Medications have changed    No medications on file   Stop Taking    IBUPROFEN (MOTRIN) 800 MG TABLET    Take 1 Tab by mouth every eight (8) hours as needed. OXYCODONE-ACETAMINOPHEN (PERCOCET) 5-325 MG PER TABLET    Take 1 Tab by mouth every four (4) hours as needed. Max Daily Amount: 6 Tabs.     _______________________________    Attestations:  Scribe Attestation     Wilmer Peterson acting as a scribe for and in the presence of Neymar Hi PA-C      April 10, 2018 at 11:55 AM       Provider Attestation:      I personally performed the services described in the documentation, reviewed the documentation, as recorded by the scribe in my presence, and it accurately and completely records my words and actions.  April 10, 2018 at 11:55 AM - Neymar Hi PA-C    _______________________________

## 2018-07-01 ENCOUNTER — HOSPITAL ENCOUNTER (EMERGENCY)
Age: 23
Discharge: HOME | End: 2018-07-01
Attending: EMERGENCY MEDICINE
Payer: MEDICAID

## 2018-07-01 VITALS
RESPIRATION RATE: 14 BRPM | SYSTOLIC BLOOD PRESSURE: 141 MMHG | DIASTOLIC BLOOD PRESSURE: 99 MMHG | OXYGEN SATURATION: 100 % | TEMPERATURE: 98.7 F | HEART RATE: 64 BPM

## 2018-07-01 DIAGNOSIS — B96.89 BV (BACTERIAL VAGINOSIS): Primary | ICD-10-CM

## 2018-07-01 DIAGNOSIS — N76.0 BV (BACTERIAL VAGINOSIS): Primary | ICD-10-CM

## 2018-07-01 LAB
APPEARANCE UR: NORMAL
BILIRUB UR QL: NEGATIVE
COLOR UR: YELLOW
GLUCOSE UR STRIP.AUTO-MCNC: NEGATIVE MG/DL
HCG UR QL: NEGATIVE
HGB UR QL STRIP: NEGATIVE
KETONES UR QL STRIP.AUTO: NEGATIVE MG/DL
LEUKOCYTE ESTERASE UR QL STRIP.AUTO: NEGATIVE
NITRITE UR QL STRIP.AUTO: NEGATIVE
PH UR STRIP: 6.5 [PH] (ref 5–8)
PROT UR STRIP-MCNC: NEGATIVE MG/DL
SERVICE CMNT-IMP: NORMAL
SP GR UR REFRACTOMETRY: 1.03 (ref 1–1.03)
UROBILINOGEN UR QL STRIP.AUTO: 1 EU/DL (ref 0.2–1)
WET PREP GENITAL: NORMAL

## 2018-07-01 PROCEDURE — 74011250636 HC RX REV CODE- 250/636: Performed by: EMERGENCY MEDICINE

## 2018-07-01 PROCEDURE — 81003 URINALYSIS AUTO W/O SCOPE: CPT | Performed by: EMERGENCY MEDICINE

## 2018-07-01 PROCEDURE — 81025 URINE PREGNANCY TEST: CPT | Performed by: EMERGENCY MEDICINE

## 2018-07-01 PROCEDURE — 74011250637 HC RX REV CODE- 250/637: Performed by: EMERGENCY MEDICINE

## 2018-07-01 PROCEDURE — 99284 EMERGENCY DEPT VISIT MOD MDM: CPT

## 2018-07-01 PROCEDURE — 87210 SMEAR WET MOUNT SALINE/INK: CPT | Performed by: EMERGENCY MEDICINE

## 2018-07-01 PROCEDURE — 96372 THER/PROPH/DIAG INJ SC/IM: CPT

## 2018-07-01 PROCEDURE — 74011000250 HC RX REV CODE- 250: Performed by: EMERGENCY MEDICINE

## 2018-07-01 PROCEDURE — 87491 CHLMYD TRACH DNA AMP PROBE: CPT | Performed by: EMERGENCY MEDICINE

## 2018-07-01 RX ORDER — METRONIDAZOLE 500 MG/1
500 TABLET ORAL 2 TIMES DAILY
Qty: 14 TAB | Refills: 0 | Status: SHIPPED | OUTPATIENT
Start: 2018-07-01 | End: 2018-07-08

## 2018-07-01 RX ORDER — AZITHROMYCIN 250 MG/1
1000 TABLET, FILM COATED ORAL
Status: COMPLETED | OUTPATIENT
Start: 2018-07-01 | End: 2018-07-01

## 2018-07-01 RX ADMIN — LIDOCAINE HYDROCHLORIDE 250 MG: 10 INJECTION, SOLUTION EPIDURAL; INFILTRATION; INTRACAUDAL; PERINEURAL at 02:15

## 2018-07-01 RX ADMIN — AZITHROMYCIN 1000 MG: 250 TABLET, FILM COATED ORAL at 02:14

## 2018-07-01 NOTE — DISCHARGE INSTRUCTIONS
Bacterial Vaginosis: Care Instructions  Your Care Instructions    Bacterial vaginosis is a type of vaginal infection. It is caused by excess growth of certain bacteria that are normally found in the vagina. Symptoms can include itching, swelling, pain when you urinate or have sex, and a gray or yellow discharge with a \"fishy\" odor. It is not considered an infection that is spread through sexual contact. Although symptoms can be annoying and uncomfortable, bacterial vaginosis does not usually cause other health problems. However, if you have it while you are pregnant, it can cause complications. While the infection may go away on its own, most doctors use antibiotics to treat it. You may have been prescribed pills or vaginal cream. With treatment, bacterial vaginosis usually clears up in 5 to 7 days. Follow-up care is a key part of your treatment and safety. Be sure to make and go to all appointments, and call your doctor if you are having problems. It's also a good idea to know your test results and keep a list of the medicines you take. How can you care for yourself at home? · Take your antibiotics as directed. Do not stop taking them just because you feel better. You need to take the full course of antibiotics. · Do not eat or drink anything that contains alcohol if you are taking metronidazole (Flagyl). · Keep using your medicine if you start your period. Use pads instead of tampons while using a vaginal cream or suppository. Tampons can absorb the medicine. · Wear loose cotton clothing. Do not wear nylon and other materials that hold body heat and moisture close to the skin. · Do not scratch. Relieve itching with a cold pack or a cool bath. · Do not wash your vaginal area more than once a day. Use plain water or a mild, unscented soap. Do not douche. When should you call for help?   Watch closely for changes in your health, and be sure to contact your doctor if:  ? · You have unexpected vaginal bleeding. ? · You have a fever. ? · You have new or increased pain in your vagina or pelvis. ? · You are not getting better after 1 week. ? · Your symptoms return after you finish the course of your medicine. Where can you learn more? Go to http://venecia-trudy.info/. Soha Slater in the search box to learn more about \"Bacterial Vaginosis: Care Instructions. \"  Current as of: October 13, 2016  Content Version: 11.4  © 2028-2756 [employer]. Care instructions adapted under license by [employer] (which disclaims liability or warranty for this information). If you have questions about a medical condition or this instruction, always ask your healthcare professional. [address] any warranty or liability for your use of this information.

## 2018-07-01 NOTE — ED PROVIDER NOTES
EMERGENCY DEPARTMENT HISTORY AND PHYSICAL EXAM    Date: 2018  Patient Name: Jamari Calles    History of Presenting Illness     Chief Complaint   Patient presents with    Pelvic Pain         History Provided By: Patient    Chief Complaint: pelvic pain and vaginal discharge  Duration: 3 Weeks  Timing:  Acute  Location: vaginal, pelvic   Quality: Aching  Severity: Moderate  Modifying Factors: h/o trich and chlamydia  Associated Symptoms: denies any other associated signs or symptoms      Additional History (Context): Marcelino Mireles is a 25 y.o. female with STI; [id number]; BV; herpes who presents with pelvic discomfort and vaginal discharge x 2 weeks. Denies n/v/d, fever, flank pain. +urinary frequency and incomplete voiding. LNMP 18. PCP: Rut Beasley CNM    Current Facility-Administered Medications   Medication Dose Route Frequency Provider Last Rate Last Dose    cefTRIAXone (ROCEPHIN) 250 mg in lidocaine (PF) (XYLOCAINE) 10 mg/mL (1 %) IM injection  250 mg IntraMUSCular ONCE Diony SARIAH Jonas        azithromycin (ZITHROMAX) tablet 1,000 mg  1,000 mg Oral NOW Diony SARIAH Jonas         Current Outpatient Prescriptions   Medication Sig Dispense Refill    metroNIDAZOLE (FLAGYL) 500 mg tablet Take 1 Tab by mouth two (2) times a day for 7 days. 14 Tab 0    valACYclovir (VALTREX) 1 gram tablet Take  by mouth.  nystatin (MYCOSTATIN) topical cream Apply  to affected area two (2) times daily as needed for Skin Irritation. 15 g 0    ferrous fumarate 324 mg (106 mg iron) tab Take 1 Tab by mouth two (2) times a day. 60 Tab 5    AMBULATORY BREAST PUMP Use as directed for breast feeding 1 Pump(s) 0    AMBULATORY BREAST PUMP Use breast pump as directed for breast feeding 1 Pump(s) 0    prenatal vit-iron fumarate-fa (PRENATAL VITAMIN) 27-0.8 mg tab tablet Take 1 Tab by mouth daily.  61 Tab 5       Past History     Past Medical History:  Past Medical History:   Diagnosis Date  Genital herpes        Past Surgical History:  No past surgical history on file. Family History:  Family History   Problem Relation Age of Onset    No Known Problems Mother     Hypertension Father     Hypertension Paternal Grandmother     Stroke Paternal Grandmother     Hypertension Paternal Grandfather        Social History:  Social History   Substance Use Topics    Smoking status: Former Smoker     Packs/day: 0.25     Quit date: 9/16/2017    Smokeless tobacco: Never Used    Alcohol use No       Allergies:  No Known Allergies      Review of Systems   Review of Systems   Constitutional: Negative for fever. Genitourinary: Positive for frequency, pelvic pain and vaginal discharge. Negative for dysuria, flank pain and vaginal bleeding. All other systems reviewed and are negative. All Other Systems Negative  Physical Exam     Vitals:    07/01/18 0106   BP: 122/88   Pulse: 73   Resp: 14   Temp: 98.4 °F (36.9 °C)   SpO2: 98%     Physical Exam   Constitutional: She is oriented to person, place, and time. She appears well-developed. HENT:   Head: Normocephalic and atraumatic. Eyes: Pupils are equal, round, and reactive to light. Neck: No JVD present. No tracheal deviation present. No thyromegaly present. Cardiovascular: Normal rate, regular rhythm and normal heart sounds. Exam reveals no gallop and no friction rub. No murmur heard. Pulmonary/Chest: Effort normal and breath sounds normal. No stridor. No respiratory distress. She has no wheezes. She has no rales. She exhibits no tenderness. Abdominal: Soft. She exhibits no distension and no mass. There is tenderness. There is no rebound and no guarding. Mild suprapubic TTP   Musculoskeletal: She exhibits no edema or tenderness. Lymphadenopathy:     She has no cervical adenopathy. Neurological: She is alert and oriented to person, place, and time. Skin: Skin is warm and dry. No rash noted. No erythema. No pallor.    Psychiatric: She has a normal mood and affect. Her behavior is normal. Thought content normal.   Nursing note and vitals reviewed. Diagnostic Study Results     Labs -     Recent Results (from the past 12 hour(s))   WET PREP    Collection Time: 07/01/18  1:10 AM   Result Value Ref Range    Special Requests: NO SPECIAL REQUESTS      Wet prep MANY  CLUE CELLS PRESENT        Wet prep NO YEAST SEEN      Wet prep NO TRICHOMONAS SEEN     URINALYSIS W/ RFLX MICROSCOPIC    Collection Time: 07/01/18  1:15 AM   Result Value Ref Range    Color YELLOW      Appearance CLOUDY      Specific gravity 1.027 1.005 - 1.030      pH (UA) 6.5 5.0 - 8.0      Protein NEGATIVE  NEG mg/dL    Glucose NEGATIVE  NEG mg/dL    Ketone NEGATIVE  NEG mg/dL    Bilirubin NEGATIVE  NEG      Blood NEGATIVE  NEG      Urobilinogen 1.0 0.2 - 1.0 EU/dL    Nitrites NEGATIVE  NEG      Leukocyte Esterase NEGATIVE  NEG     HCG URINE, QL    Collection Time: 07/01/18  1:15 AM   Result Value Ref Range    HCG urine, QL NEGATIVE  NEG         Radiologic Studies -   No orders to display     CT Results  (Last 48 hours)    None        CXR Results  (Last 48 hours)    None            Medical Decision Making   I am the first provider for this patient. I reviewed the vital signs, available nursing notes, past medical history, past surgical history, family history and social history. Vital Signs-Reviewed the patient's vital signs. Records Reviewed: Nursing Notes    Procedures:  Pelvic Exam  Date/Time: 7/1/2018 1:28 AM  Performed by: PA  Procedure duration:  3 minutes. Type of exam performed: bimanual and speculum. External genitalia appearance: normal.    Vaginal exam:  discharge. The amount of discharge was:  mild. The discharge was malodorous. Cervical exam:  minimal cervical motion tenderness and os closed. Specimen(s) collected:  chlamydia, GC and vaginal culture. Bimanual exam:  uterine tenderness.     Patient tolerance: Patient tolerated the procedure well with no immediate complications          Provider Notes (Medical Decision Making): STI; PID; UTI; pregnancy; BV; ectopic    Treat BV. Prophylaxis given for G&C. MED RECONCILIATION:  Current Facility-Administered Medications   Medication Dose Route Frequency    cefTRIAXone (ROCEPHIN) 250 mg in lidocaine (PF) (XYLOCAINE) 10 mg/mL (1 %) IM injection  250 mg IntraMUSCular ONCE    azithromycin (ZITHROMAX) tablet 1,000 mg  1,000 mg Oral NOW     Current Outpatient Prescriptions   Medication Sig    metroNIDAZOLE (FLAGYL) 500 mg tablet Take 1 Tab by mouth two (2) times a day for 7 days.  valACYclovir (VALTREX) 1 gram tablet Take  by mouth.  nystatin (MYCOSTATIN) topical cream Apply  to affected area two (2) times daily as needed for Skin Irritation.  ferrous fumarate 324 mg (106 mg iron) tab Take 1 Tab by mouth two (2) times a day.  AMBULATORY BREAST PUMP Use as directed for breast feeding    AMBULATORY BREAST PUMP Use breast pump as directed for breast feeding    prenatal vit-iron fumarate-fa (PRENATAL VITAMIN) 27-0.8 mg tab tablet Take 1 Tab by mouth daily. Disposition:  home    DISCHARGE NOTE:   2:09 AM    Pt has been reexamined. Patient has no new complaints, changes, or physical findings. Care plan outlined and precautions discussed. Results of labs were reviewed with the patient. All medications were reviewed with the patient; will d/c home with flagyl. All of pt's questions and concerns were addressed. Patient was instructed and agrees to follow up with PCP, as well as to return to the ED upon further deterioration. Patient is ready to go home.     Follow-up Information     Follow up With Details Comments Contact Info    Navarro Michaud CNM Schedule an appointment as soon as possible for a visit in 2 days      [facility] EMERGENCY DEPT  If symptoms worsen return immediately [address]  [phone]          Current Discharge Medication List      START taking these medications    Details   metroNIDAZOLE (FLAGYL) 500 mg tablet Take 1 Tab by mouth two (2) times a day for 7 days. Qty: 14 Tab, Refills: 0               Diagnosis     Clinical Impression:   1.  BV (bacterial vaginosis)

## 2018-07-01 NOTE — ED TRIAGE NOTES
Pt arrived to ED for c/o pelvic pain that started 2 weeks ago. Pt states that she is diagnosed with BV every other month since she turned 21, despite proper hygiene and safe sex practices. Pt reports white discharge and foul odor.

## 2018-09-28 ENCOUNTER — HOSPITAL ENCOUNTER (EMERGENCY)
Age: 23
Discharge: HOME | End: 2018-09-28
Attending: EMERGENCY MEDICINE
Payer: MEDICAID

## 2018-09-28 VITALS
RESPIRATION RATE: 16 BRPM | DIASTOLIC BLOOD PRESSURE: 82 MMHG | WEIGHT: 230 LBS | HEIGHT: 68 IN | BODY MASS INDEX: 34.86 KG/M2 | OXYGEN SATURATION: 100 % | HEART RATE: 77 BPM | TEMPERATURE: 98.3 F | SYSTOLIC BLOOD PRESSURE: 126 MMHG

## 2018-09-28 DIAGNOSIS — N39.0 URINARY TRACT INFECTION, ACUTE: Primary | ICD-10-CM

## 2018-09-28 DIAGNOSIS — B96.89 BV (BACTERIAL VAGINOSIS): ICD-10-CM

## 2018-09-28 DIAGNOSIS — N76.0 BV (BACTERIAL VAGINOSIS): ICD-10-CM

## 2018-09-28 LAB
AMORPH CRY URNS QL MICRO: ABNORMAL
APPEARANCE UR: NORMAL
BACTERIA URNS QL MICRO: ABNORMAL /HPF
BILIRUB UR QL: NEGATIVE
COLOR UR: NORMAL
EPITH CASTS URNS QL MICRO: ABNORMAL /LPF (ref 0–5)
GLUCOSE UR STRIP.AUTO-MCNC: NEGATIVE MG/DL
HCG UR QL: NEGATIVE
HGB UR QL STRIP: NORMAL
KETONES UR QL STRIP.AUTO: NORMAL MG/DL
LEUKOCYTE ESTERASE UR QL STRIP.AUTO: NORMAL
NITRITE UR QL STRIP.AUTO: NEGATIVE
PH UR STRIP: 7.5 [PH]
PROT UR STRIP-MCNC: 100 MG/DL
SERVICE CMNT-IMP: NORMAL
SP GR UR REFRACTOMETRY: 1.02 (ref 1–1.04)
UROBILINOGEN UR QL STRIP.AUTO: 1 EU/DL
WBC URNS QL MICRO: ABNORMAL /HPF (ref 0–5)
WET PREP GENITAL: NORMAL

## 2018-09-28 PROCEDURE — 87210 SMEAR WET MOUNT SALINE/INK: CPT | Performed by: PHYSICIAN ASSISTANT

## 2018-09-28 PROCEDURE — 99284 EMERGENCY DEPT VISIT MOD MDM: CPT

## 2018-09-28 PROCEDURE — 81025 URINE PREGNANCY TEST: CPT | Performed by: PHYSICIAN ASSISTANT

## 2018-09-28 PROCEDURE — 81001 URINALYSIS AUTO W/SCOPE: CPT | Performed by: PHYSICIAN ASSISTANT

## 2018-09-28 PROCEDURE — 87491 CHLMYD TRACH DNA AMP PROBE: CPT | Performed by: PHYSICIAN ASSISTANT

## 2018-09-28 RX ORDER — CEPHALEXIN 500 MG/1
500 CAPSULE ORAL 2 TIMES DAILY
Qty: 14 CAP | Refills: 0 | Status: SHIPPED | OUTPATIENT
Start: 2018-09-28 | End: 2018-10-05

## 2018-09-28 RX ORDER — METRONIDAZOLE 500 MG/1
500 TABLET ORAL 2 TIMES DAILY
Qty: 14 TAB | Refills: 0 | Status: SHIPPED | OUTPATIENT
Start: 2018-09-28 | End: 2018-10-05

## 2018-09-28 RX ORDER — FLUCONAZOLE 150 MG/1
TABLET ORAL
Qty: 1 TAB | Refills: 0 | Status: SHIPPED | OUTPATIENT
Start: 2018-09-28 | End: 2019-06-13

## 2018-09-28 NOTE — ED NOTES
Patient laying in bed with eyes closed, awakens easily to verbal stimuli. No signs of distress noted at this time. Will continue to monitor.

## 2018-09-28 NOTE — LETTER
10/3/2018 12:54 PM 
 
Ms. Nasrin Chacko LILA Calles [address] Ms. Tyrone SHARP Marli, We have been unable to reach you by phone. Please contact [facility] at your earliest convenience regarding some lab results from your ER visit on 09/28/18. The number where we can be reached is [phone]. Thank you, Lyn Almendarez PA-C

## 2018-09-28 NOTE — DISCHARGE INSTRUCTIONS
I have reviewed discharge instructions with the patient. The patient verbalized understanding. Bacterial Vaginosis: Care Instructions  Your Care Instructions    Bacterial vaginosis is a type of vaginal infection. It is caused by excess growth of certain bacteria that are normally found in the vagina. Symptoms can include itching, swelling, pain when you urinate or have sex, and a gray or yellow discharge with a \"fishy\" odor. It is not considered an infection that is spread through sexual contact. Although symptoms can be annoying and uncomfortable, bacterial vaginosis does not usually cause other health problems. However, if you have it while you are pregnant, it can cause complications. While the infection may go away on its own, most doctors use antibiotics to treat it. You may have been prescribed pills or vaginal cream. With treatment, bacterial vaginosis usually clears up in 5 to 7 days. Follow-up care is a key part of your treatment and safety. Be sure to make and go to all appointments, and call your doctor if you are having problems. It's also a good idea to know your test results and keep a list of the medicines you take. How can you care for yourself at home? · Take your antibiotics as directed. Do not stop taking them just because you feel better. You need to take the full course of antibiotics. · Do not eat or drink anything that contains alcohol if you are taking metronidazole (Flagyl). · Keep using your medicine if you start your period. Use pads instead of tampons while using a vaginal cream or suppository. Tampons can absorb the medicine. · Wear loose cotton clothing. Do not wear nylon and other materials that hold body heat and moisture close to the skin. · Do not scratch. Relieve itching with a cold pack or a cool bath. · Do not wash your vaginal area more than once a day. Use plain water or a mild, unscented soap. Do not douche. When should you call for help?   Watch closely for changes in your health, and be sure to contact your doctor if:    · You have unexpected vaginal bleeding.     · You have a fever.     · You have new or increased pain in your vagina or pelvis.     · You are not getting better after 1 week.     · Your symptoms return after you finish the course of your medicine. Where can you learn more? Go to http://venecia-trudy.info/. Kimber Schmitz in the search box to learn more about \"Bacterial Vaginosis: Care Instructions. \"  Current as of: October 6, 2017  Content Version: 11.7  © 7841-2495 [employer]. Care instructions adapted under license by [employer] (which disclaims liability or warranty for this information). If you have questions about a medical condition or this instruction, always ask your healthcare professional. [place] [address] any warranty or liability for your use of this information. Urinary Tract Infection in Women: Care Instructions  Your Care Instructions    A urinary tract infection, or UTI, is a general term for an infection anywhere between the kidneys and the urethra (where urine comes out). Most UTIs are bladder infections. They often cause pain or burning when you urinate. UTIs are caused by bacteria and can be cured with antibiotics. Be sure to complete your treatment so that the infection goes away. Follow-up care is a key part of your treatment and safety. Be sure to make and go to all appointments, and call your doctor if you are having problems. It's also a good idea to know your test results and keep a list of the medicines you take. How can you care for yourself at home? · Take your antibiotics as directed. Do not stop taking them just because you feel better. You need to take the full course of antibiotics. · Drink extra water and other fluids for the next day or two. This may help wash out the bacteria that are causing the infection.  (If you have kidney, heart, or liver disease and have to limit fluids, talk with your doctor before you increase your fluid intake.)  · Avoid drinks that are carbonated or have caffeine. They can irritate the bladder. · Urinate often. Try to empty your bladder each time. · To relieve pain, take a hot bath or lay a heating pad set on low over your lower belly or genital area. Never go to sleep with a heating pad in place. To prevent UTIs  · Drink plenty of water each day. This helps you urinate often, which clears bacteria from your system. (If you have kidney, heart, or liver disease and have to limit fluids, talk with your doctor before you increase your fluid intake.)  · Urinate when you need to. · Urinate right after you have sex. · Change sanitary pads often. · Avoid douches, bubble baths, feminine hygiene sprays, and other feminine hygiene products that have deodorants. · After going to the bathroom, wipe from front to back. When should you call for help? Call your doctor now or seek immediate medical care if:    · Symptoms such as fever, chills, nausea, or vomiting get worse or appear for the first time.     · You have new pain in your back just below your rib cage. This is called flank pain.     · There is new blood or pus in your urine.     · You have any problems with your antibiotic medicine.    Watch closely for changes in your health, and be sure to contact your doctor if:    · You are not getting better after taking an antibiotic for 2 days.     · Your symptoms go away but then come back. Where can you learn more? Go to http://venecia-trudy.info/. Enter Q661 in the search box to learn more about \"Urinary Tract Infection in Women: Care Instructions. \"  Current as of: May 12, 2017  Content Version: 11.7  © 2597-7180 [employer]. Care instructions adapted under license by [employer] (which disclaims liability or warranty for this information).  If you have questions about a medical condition or this instruction, always ask your healthcare professional. [place] [address] any warranty or liability for your use of this information.

## 2018-09-28 NOTE — ED PROVIDER NOTES
EMERGENCY DEPARTMENT HISTORY AND PHYSICAL EXAM    Date: 9/28/2018  Patient Name: Crow Calles    History of Presenting Illness     Chief Complaint   Patient presents with    Pelvic Pain    Blood in Urine         History Provided By: Patient    Chief Complaint: pelvic pain  Duration: 5 Days  Timing:  Constant  Location:   Quality: Dull  Severity: 8 out of 10  Modifying Factors: none  Associated Symptoms: dysuria, urgency, frequency, DUB      Additional History (Context): Trudi Akhtar is a 25 y.o. female with UTI, chlamydia who presents with pelvic pain, dysuria, urgency, frequency, DUB x 5 days. Pt states she finished her LMP last Sunday and then started bleeding again. Denies fever, chills, N/V/D, back pain, dizziness or any other complaints or symptoms. States symptoms are similar to UTI and BV. States not concerned for STD at this time. PCP: Luis Alston CNM    Current Outpatient Prescriptions   Medication Sig Dispense Refill    cephALEXin (KEFLEX) 500 mg capsule Take 1 Cap by mouth two (2) times a day for 7 days. 14 Cap 0    metroNIDAZOLE (FLAGYL) 500 mg tablet Take 1 Tab by mouth two (2) times a day for 7 days. 14 Tab 0    fluconazole (DIFLUCAN) 150 mg tablet Take at end of antibiotic course, if needed. 1 Tab 0    valACYclovir (VALTREX) 1 gram tablet Take  by mouth.  ferrous fumarate 324 mg (106 mg iron) tab Take 1 Tab by mouth two (2) times a day. 60 Tab 5    AMBULATORY BREAST PUMP Use as directed for breast feeding 1 Pump(s) 0    AMBULATORY BREAST PUMP Use breast pump as directed for breast feeding 1 Pump(s) 0    prenatal vit-iron fumarate-fa (PRENATAL VITAMIN) 27-0.8 mg tab tablet Take 1 Tab by mouth daily. 61 Tab 5       Past History     Past Medical History:  Past Medical History:   Diagnosis Date    Genital herpes        Past Surgical History:  No past surgical history on file.     Family History:  Family History   Problem Relation Age of Onset    No Known Problems Mother     Hypertension Father     Hypertension Paternal Grandmother     Stroke Paternal Grandmother     Hypertension Paternal Grandfather        Social History:  Social History   Substance Use Topics    Smoking status: Former Smoker     Packs/day: 0.25     Quit date: 9/16/2017    Smokeless tobacco: Never Used    Alcohol use No       Allergies:  No Known Allergies      Review of Systems   Review of Systems   Constitutional: Negative for chills and fever. HENT: Negative. Respiratory: Negative. Cardiovascular: Negative. Gastrointestinal: Negative. Genitourinary: Positive for dysuria, frequency, menstrual problem, pelvic pain, urgency and vaginal bleeding. Negative for flank pain. Neurological: Negative. All other systems reviewed and are negative. All Other Systems Negative  Physical Exam     Vitals:    09/28/18 1550   BP: 126/82   Pulse: 77   Resp: 16   Temp: 98.3 °F (36.8 °C)   SpO2: 100%   Weight: 104.3 kg (230 lb)   Height: 5' 8\" (1.727 m)     Physical Exam   Constitutional: She is oriented to person, place, and time. She appears well-developed and well-nourished. No distress. HENT:   Head: Normocephalic and atraumatic. Right Ear: External ear normal.   Left Ear: External ear normal.   Mouth/Throat: Oropharynx is clear and moist.   Eyes: Conjunctivae are normal.   Neck: Normal range of motion. Neck supple. Cardiovascular: Normal rate and regular rhythm. Pulmonary/Chest: Effort normal and breath sounds normal.   Abdominal: Soft. Bowel sounds are normal. There is tenderness in the suprapubic area. There is no CVA tenderness. Musculoskeletal: Normal range of motion. Neurological: She is alert and oriented to person, place, and time. Skin: Skin is warm and dry. She is not diaphoretic. Psychiatric: She has a normal mood and affect.           Diagnostic Study Results     Labs -     Recent Results (from the past 12 hour(s))   URINALYSIS W/ RFLX MICROSCOPIC Collection Time: 09/28/18  4:34 PM   Result Value Ref Range    Color DARK YELLOW      Appearance OPAQUE      Specific gravity 1.024 1.003 - 1.040      pH (UA) 7.5      Protein 100 mg/dL    Glucose NEGATIVE  mg/dL    Ketone TRACE mg/dL    Bilirubin NEGATIVE       Blood LARGE      Urobilinogen 1.0 EU/dL    Nitrites NEGATIVE       Leukocyte Esterase LARGE     URINE MICROSCOPIC ONLY    Collection Time: 09/28/18  4:34 PM   Result Value Ref Range    WBC 36 to 50 0 - 5 /hpf    Epithelial cells 3+ 0 - 5 /lpf    Bacteria 1+ (A) NEG /hpf    Amorphous Crystals FEW (A) NEG     HCG URINE, QL    Collection Time: 09/28/18  4:35 PM   Result Value Ref Range    HCG urine, QL NEGATIVE  NEG     WET PREP    Collection Time: 09/28/18  5:21 PM   Result Value Ref Range    Special Requests: NO SPECIAL REQUESTS      Wet prep RARE  CLUE CELLS PRESENT        Wet prep NO TRICHOMONAS SEEN      Wet prep NO YEAST SEEN         Radiologic Studies -   No orders to display     CT Results  (Last 48 hours)    None        CXR Results  (Last 48 hours)    None            Medical Decision Making   I am the first provider for this patient. I reviewed the vital signs, available nursing notes, past medical history, past surgical history, family history and social history. Vital Signs-Reviewed the patient's vital signs. Pulse Oximetry Analysis - 100% on RA    Records Reviewed: Nursing Notes    Procedures:  Pelvic Exam  Date/Time: 9/28/2018 4:45 PM  Performed by: PA  Procedure duration:  5 minutes. Exam assisted by:  Al Burns RN. Type of exam performed: bimanual and speculum. External genitalia appearance: normal.    Vaginal exam:  bleeding. Bleeding: mild  Cervical exam:  active bleeding from os, normal and no cervical motion tenderness. Specimen(s) collected:  chlamydia, GC and vaginal culture.   Bimanual exam:  normal.    Patient tolerance: Patient tolerated the procedure well with no immediate complications          Provider Notes (Medical Decision Making): Pt c/o urinary symptoms, DUB, and pelvic pain. UTI noted on UA. Mild bleeding on pelvic exam. +BV on wet prep. Rx for keflex and flagyl. GYn f/u. Pt declines empiric treatment for gonorrhea and chlamydia. SARIAH Olson 6:16 PM        MED RECONCILIATION:  No current facility-administered medications for this encounter. Current Outpatient Prescriptions   Medication Sig    cephALEXin (KEFLEX) 500 mg capsule Take 1 Cap by mouth two (2) times a day for 7 days.  metroNIDAZOLE (FLAGYL) 500 mg tablet Take 1 Tab by mouth two (2) times a day for 7 days.  fluconazole (DIFLUCAN) 150 mg tablet Take at end of antibiotic course, if needed.  valACYclovir (VALTREX) 1 gram tablet Take  by mouth.  ferrous fumarate 324 mg (106 mg iron) tab Take 1 Tab by mouth two (2) times a day.  AMBULATORY BREAST PUMP Use as directed for breast feeding    AMBULATORY BREAST PUMP Use breast pump as directed for breast feeding    prenatal vit-iron fumarate-fa (PRENATAL VITAMIN) 27-0.8 mg tab tablet Take 1 Tab by mouth daily. Disposition:  D/c to home    DISCHARGE NOTE:     Pt has been reexamined. Patient has no new complaints, changes, or physical findings. Care plan outlined and precautions discussed. Results of labs were reviewed with the patient. All medications were reviewed with the patient; will d/c home with keflex, flagyl. All of pt's questions and concerns were addressed. Patient was instructed and agrees to follow up with gyn, as well as to return to the ED upon further deterioration. Patient is ready to go home. Follow-up Information     Follow up With Details Comments Summer Olivares, [facility]   [address] [facility] [address] [phone]            Current Discharge Medication List      START taking these medications    Details   cephALEXin (KEFLEX) 500 mg capsule Take 1 Cap by mouth two (2) times a day for 7 days.   Qty: 14 Cap, Refills: 0 metroNIDAZOLE (FLAGYL) 500 mg tablet Take 1 Tab by mouth two (2) times a day for 7 days. Qty: 14 Tab, Refills: 0      fluconazole (DIFLUCAN) 150 mg tablet Take at end of antibiotic course, if needed. Qty: 1 Tab, Refills: 0         CONTINUE these medications which have NOT CHANGED    Details   valACYclovir (VALTREX) 1 gram tablet Take  by mouth. ferrous fumarate 324 mg (106 mg iron) tab Take 1 Tab by mouth two (2) times a day. Qty: 60 Tab, Refills: 5    Associated Diagnoses: Iron deficiency anemia during pregnancy, antepartum      !! AMBULATORY BREAST PUMP Use as directed for breast feeding  Qty: 1 Pump(s), Refills: 0    Associated Diagnoses: Encounter for breast feeding counseling      !! AMBULATORY BREAST PUMP Use breast pump as directed for breast feeding  Qty: 1 Pump(s), Refills: 0    Associated Diagnoses: Encounter for breast feeding counseling      prenatal vit-iron fumarate-fa (PRENATAL VITAMIN) 27-0.8 mg tab tablet Take 1 Tab by mouth daily. Qty: 60 Tab, Refills: 5    Associated Diagnoses: Encounter for supervision of normal first pregnancy in first trimester       !! - Potential duplicate medications found. Please discuss with provider. STOP taking these medications       nystatin (MYCOSTATIN) topical cream Comments:   Reason for Stopping:                 Diagnosis     Clinical Impression:   1. Urinary tract infection, acute    2.  BV (bacterial vaginosis)

## 2018-10-01 LAB
C TRACH RRNA SPEC QL NAA+PROBE: NEGATIVE
N GONORRHOEA RRNA SPEC QL NAA+PROBE: POSITIVE
SPECIMEN SOURCE: ABNORMAL

## 2018-10-03 ENCOUNTER — HOSPITAL ENCOUNTER (EMERGENCY)
Age: 23
Discharge: HOME | End: 2018-10-03
Attending: EMERGENCY MEDICINE
Payer: MEDICAID

## 2018-10-03 VITALS
SYSTOLIC BLOOD PRESSURE: 129 MMHG | OXYGEN SATURATION: 98 % | DIASTOLIC BLOOD PRESSURE: 78 MMHG | RESPIRATION RATE: 16 BRPM | TEMPERATURE: 98 F | HEART RATE: 100 BPM

## 2018-10-03 DIAGNOSIS — A54.9 GONORRHEA: Primary | ICD-10-CM

## 2018-10-03 PROCEDURE — 74011250636 HC RX REV CODE- 250/636: Performed by: PHYSICIAN ASSISTANT

## 2018-10-03 PROCEDURE — 96372 THER/PROPH/DIAG INJ SC/IM: CPT

## 2018-10-03 PROCEDURE — 99282 EMERGENCY DEPT VISIT SF MDM: CPT

## 2018-10-03 RX ADMIN — LIDOCAINE HYDROCHLORIDE 250 MG: 10 INJECTION, SOLUTION EPIDURAL; INFILTRATION; INTRACAUDAL; PERINEURAL at 15:15

## 2018-10-03 NOTE — ED PROVIDER NOTES
EMERGENCY DEPARTMENT HISTORY AND PHYSICAL EXAM    3:05 PM      Date: (Not on file)  Patient Name: Roshni Foote    History of Presenting Illness     Chief Complaint   Patient presents with    Immunization/Injection         History Provided By: Patient    Chief Complaint: needs rocephin shot for gonorrhea  Duration:  Minutes  Timing:  Acute  Location:   Quality: n/a  Severity: N/A  Modifying Factors: n/a  Associated Symptoms: denies any other associated signs or symptoms      Additional History (Context): Roshni Foote is a 25 y.o. female who presents to the emergency department for rocephin shot for gonorrhea treatment. She would also like coverage for chlamydia. She filled meds from prior visit. She has no other concerns. Pt denies any fevers or chills, headache, dizziness or light headedness, ENT issues, CP or discomfort, SOB, cough, n/v/d/c, abd pain, back pain, diaphoresis, melena/hematochezia, dysuria, hematuria, frequency, focal weakness/numbness/tingling, or rash. Patient has no other complaints at this time. PCP:  Phylicia Cerda CNM      Current Facility-Administered Medications   Medication Dose Route Frequency Provider Last Rate Last Dose    cefTRIAXone (ROCEPHIN) 250 mg in lidocaine (PF) (XYLOCAINE) 10 mg/mL (1 %) IM injection  250 mg IntraMUSCular ONCE Aparna Yang PA-C         Current Outpatient Prescriptions   Medication Sig Dispense Refill    cephALEXin (KEFLEX) 500 mg capsule Take 1 Cap by mouth two (2) times a day for 7 days. 14 Cap 0    metroNIDAZOLE (FLAGYL) 500 mg tablet Take 1 Tab by mouth two (2) times a day for 7 days. 14 Tab 0    fluconazole (DIFLUCAN) 150 mg tablet Take at end of antibiotic course, if needed. 1 Tab 0    valACYclovir (VALTREX) 1 gram tablet Take  by mouth.  ferrous fumarate 324 mg (106 mg iron) tab Take 1 Tab by mouth two (2) times a day.  60 Tab 5    AMBULATORY BREAST PUMP Use as directed for breast feeding 1 Pump(s) 0  AMBULATORY BREAST PUMP Use breast pump as directed for breast feeding 1 Pump(s) 0    prenatal vit-iron fumarate-fa (PRENATAL VITAMIN) 27-0.8 mg tab tablet Take 1 Tab by mouth daily. 61 Tab 5       Past History     Past Medical History:  Past Medical History:   Diagnosis Date    Genital herpes        Past Surgical History:  No past surgical history on file. Family History:  Family History   Problem Relation Age of Onset    No Known Problems Mother     Hypertension Father     Hypertension Paternal Grandmother     Stroke Paternal Grandmother     Hypertension Paternal Grandfather        Social History:  Social History   Substance Use Topics    Smoking status: Former Smoker     Packs/day: 0.25     Quit date: 9/16/2017    Smokeless tobacco: Never Used    Alcohol use No       Allergies:  No Known Allergies      Review of Systems       Review of Systems   Constitutional: Negative for chills and fever. HENT: Negative for congestion, rhinorrhea and sore throat. Respiratory: Negative for cough and shortness of breath. Cardiovascular: Negative for chest pain. Gastrointestinal: Negative for abdominal pain, constipation, diarrhea, nausea and vomiting. Genitourinary: Negative for dysuria, frequency and hematuria. Musculoskeletal: Negative for back pain and myalgias. Skin: Negative for rash and wound. Neurological: Negative for dizziness and headaches. Physical Exam   There were no vitals taken for this visit. Physical Exam   Constitutional: She is oriented to person, place, and time. She appears well-developed and well-nourished. No distress. HENT:   Head: Normocephalic and atraumatic. Eyes: Conjunctivae are normal.   Neck: Normal range of motion. Neck supple. Cardiovascular: Normal rate, regular rhythm and normal heart sounds. Pulmonary/Chest: Effort normal and breath sounds normal. No respiratory distress. She has no wheezes. She has no rales. She exhibits no tenderness. Abdominal: Soft. Bowel sounds are normal. She exhibits no distension. There is no tenderness. There is no rebound and no guarding. Musculoskeletal: She exhibits no edema or deformity. Neurological: She is alert and oriented to person, place, and time. She has normal reflexes. Skin: Skin is warm and dry. She is not diaphoretic. Psychiatric: She has a normal mood and affect. Nursing note and vitals reviewed. Diagnostic Study Results     Labs -  No results found for this or any previous visit (from the past 12 hour(s)). Radiologic Studies -   No results found. Medical Decision Making   I am the first provider for this patient. I reviewed the vital signs, available nursing notes, past medical history, past surgical history, family history and social history. Vital Signs-Reviewed the patient's vital signs. Pulse Oximetry Analysis -  98% on room air (Interpretation)    Records Reviewed: Nursing Notes and Old Medical Records (Time of Review: 3:05 PM)    ED Course: Progress Notes, Reevaluation, and Consults:      Provider Notes (Medical Decision Making):   Differential Diagnosis:  Candidiasis, trichomoniasis, bacterial vaginitis, GC/Chlamydia, pregnancy, urethritis/UTI, vaginal foreign body, atrophic vaginitis, contact vulvovaginitis, physiologic discharge    Plan: Pt returns for GC treatment - provided IM rocephin and azithromycin per patient request. She is advised to finish her other abx for UTI and BV. At this time, patient is stable and appropriate for discharge home. Patient demonstrates understanding of current diagnoses and is in agreement with the treatment plan. They are advised that while the likelihood of serious underlying condition is low at this point given the evaluation performed today, we cannot fully rule it out. They are advised to immediately return with any new symptoms or worsening of current condition. All questions have been answered.   Patient is given educational material regarding their diagnoses, including danger symptoms and when to return to the ED. Diagnosis     Clinical Impression:   1. Gonorrhea        Disposition: DC Home    Follow-up Information     Follow up With Details Comments Contact Info    Burke Rudolph CNM Call As needed [address] [place] [id number] [phone]      [facility] EMERGENCY DEPT Go to As needed, If symptoms worsen [address] [facility] [address]  [phone]           Patient's Medications   Start Taking    No medications on file   Continue Taking    AMBULATORY BREAST PUMP    Use breast pump as directed for breast feeding    AMBULATORY BREAST PUMP    Use as directed for breast feeding    CEPHALEXIN (KEFLEX) 500 MG CAPSULE    Take 1 Cap by mouth two (2) times a day for 7 days. FERROUS FUMARATE 324 MG (106 MG IRON) TAB    Take 1 Tab by mouth two (2) times a day. FLUCONAZOLE (DIFLUCAN) 150 MG TABLET    Take at end of antibiotic course, if needed. METRONIDAZOLE (FLAGYL) 500 MG TABLET    Take 1 Tab by mouth two (2) times a day for 7 days. PRENATAL VIT-IRON FUMARATE-FA (PRENATAL VITAMIN) 27-0.8 MG TAB TABLET    Take 1 Tab by mouth daily. VALACYCLOVIR (VALTREX) 1 GRAM TABLET    Take  by mouth.    These Medications have changed    No medications on file   Stop Taking    No medications on file     _______________________________

## 2018-10-03 NOTE — ED TRIAGE NOTES
Pt. States \"I'm here for a follow-up\" refers to test results. States \"I'm suppose to get a shot\".

## 2018-10-03 NOTE — DISCHARGE INSTRUCTIONS
Gonorrhea: Care Instructions  Your Care Instructions  Gonorrhea is a bacterial infection spread through sexual contact (sexually transmitted infection, or STI). It is found most often in the genital area but it can also infect other areas of the body, such as the rectum or throat. While most people with gonorrhea develop symptoms within a few days after infection, some people have no symptoms. Symptoms of gonorrhea include abnormal bleeding, pain or burning during urination, or a thick discharge from the vagina or penis. Antibiotics can cure gonorrhea. Both sex partners need to be treated to keep from passing the infection back and forth. Follow-up care is a key part of your treatment and safety. Be sure to make and go to all appointments, and call your doctor if you are having problems. It's also a good idea to know your test results and keep a list of the medicines you take. How can you care for yourself at home? · Your doctor probably gave you a shot of antibiotics. If your doctor prescribed antibiotic pills, take them as directed. Do not stop taking them just because you feel better. You need to take the full course of antibiotics. · Do not have sexual contact with anyone while you are being treated. If your treatment was a single dose of antibiotics, wait at least 7 days after taking the dose before you have any sexual contact. Even if you use a condom, you may pass the infection back and forth. · Wash your hands if you touch an area of gonorrhea infection. This will help prevent spreading the infection to other parts of your body or to other people. · Tell your sex partner or partners that you have gonorrhea. They should get treated, whether or not they have symptoms of infection. · Talk to your doctor about being tested again for gonorrhea in 3 months. To prevent gonorrhea in the future  · Use latex condoms every time you have sex.  Use them from the beginning to the end of sexual contact. · Talk to your partner before you have sex. Find out if he or she has or is at risk for gonorrhea or any other STI. Keep in mind that a person may be able to spread an STI even if he or she does not have symptoms. · Do not have sex if you are being treated for gonorrhea or any other STI. · Do not have sex with anyone who has symptoms of an STI, such as sores on the genitals or mouth. · Having one sex partner (who does not have STIs and does not have sex with anyone else) is a good way to avoid STIs. When should you call for help? Call 911 anytime you think you may need emergency care. For example, call if:    · You have sudden, severe pain in your belly or pelvis.    Call your doctor now or seek immediate medical care if:    · You have new belly or pelvic pain.     · You have unusual vaginal bleeding.     · You have a fever.     · You have a discharge from the vagina or penis.     · You have new or increased burning or pain with urination, or you cannot urinate.     · You have pain, swelling, or tenderness in the scrotum.     · You have joint pain.     · You have pus coming from your eyes.    Watch closely for changes in your health, and be sure to contact your doctor if:    · You think you may have been exposed to another STI.     · Your symptoms get worse or have not improved within 1 week after starting treatment.     · You have any new symptoms, such as sores, bumps, rashes, blisters, or warts in the genital or anal area.     · You have a new skin rash. Where can you learn more? Go to http://venecia-trudy.info/. Enter J235 in the search box to learn more about \"Gonorrhea: Care Instructions. \"  Current as of: November 27, 2017  Content Version: 11.7  © 2487-5642 [employer]. Care instructions adapted under license by [facility] (which disclaims liability or warranty for this information).  If you have questions about a medical condition or this instruction, always ask your healthcare professional. [place] [address] any warranty or liability for your use of this information.

## 2018-10-03 NOTE — PROGRESS NOTES
Spoke with patient on the phone. She is informed of (+) gonorrhea. She states she will come back to the ED for treatment.

## 2018-11-04 ENCOUNTER — HOSPITAL ENCOUNTER (EMERGENCY)
Age: 23
Discharge: HOME | End: 2018-11-04
Attending: EMERGENCY MEDICINE
Payer: MEDICAID

## 2018-11-04 VITALS
TEMPERATURE: 98 F | SYSTOLIC BLOOD PRESSURE: 137 MMHG | OXYGEN SATURATION: 100 % | DIASTOLIC BLOOD PRESSURE: 94 MMHG | WEIGHT: 240 LBS | HEIGHT: 67 IN | HEART RATE: 63 BPM | BODY MASS INDEX: 37.67 KG/M2 | RESPIRATION RATE: 18 BRPM

## 2018-11-04 DIAGNOSIS — B37.31 YEAST VAGINITIS: Primary | ICD-10-CM

## 2018-11-04 DIAGNOSIS — N76.0 BV (BACTERIAL VAGINOSIS): ICD-10-CM

## 2018-11-04 DIAGNOSIS — B96.89 BV (BACTERIAL VAGINOSIS): ICD-10-CM

## 2018-11-04 LAB
APPEARANCE UR: ABNORMAL
BACTERIA URNS QL MICRO: ABNORMAL /HPF
BILIRUB UR QL: NEGATIVE
COLOR UR: YELLOW
EPITH CASTS URNS QL MICRO: ABNORMAL /LPF (ref 0–5)
GLUCOSE UR STRIP.AUTO-MCNC: NEGATIVE MG/DL
HCG UR QL: NEGATIVE
HGB UR QL STRIP: NEGATIVE
KETONES UR QL STRIP.AUTO: NEGATIVE MG/DL
LEUKOCYTE ESTERASE UR QL STRIP.AUTO: ABNORMAL
MUCOUS THREADS URNS QL MICRO: ABNORMAL /LPF
NITRITE UR QL STRIP.AUTO: NEGATIVE
PH UR STRIP: 7 [PH] (ref 5–8)
PROT UR STRIP-MCNC: NEGATIVE MG/DL
RBC #/AREA URNS HPF: ABNORMAL /HPF (ref 0–5)
SERVICE CMNT-IMP: NORMAL
SP GR UR REFRACTOMETRY: 1.02 (ref 1–1.03)
UROBILINOGEN UR QL STRIP.AUTO: 1 EU/DL (ref 0.2–1)
WBC URNS QL MICRO: ABNORMAL /HPF (ref 0–4)
WET PREP GENITAL: NORMAL
YEAST URNS QL MICRO: ABNORMAL

## 2018-11-04 PROCEDURE — 87210 SMEAR WET MOUNT SALINE/INK: CPT | Performed by: PHYSICIAN ASSISTANT

## 2018-11-04 PROCEDURE — 99284 EMERGENCY DEPT VISIT MOD MDM: CPT

## 2018-11-04 PROCEDURE — 81025 URINE PREGNANCY TEST: CPT | Performed by: PHYSICIAN ASSISTANT

## 2018-11-04 PROCEDURE — 74011250637 HC RX REV CODE- 250/637: Performed by: PHYSICIAN ASSISTANT

## 2018-11-04 PROCEDURE — 74011250636 HC RX REV CODE- 250/636: Performed by: PHYSICIAN ASSISTANT

## 2018-11-04 PROCEDURE — 96372 THER/PROPH/DIAG INJ SC/IM: CPT

## 2018-11-04 PROCEDURE — 81001 URINALYSIS AUTO W/SCOPE: CPT | Performed by: PHYSICIAN ASSISTANT

## 2018-11-04 PROCEDURE — 87491 CHLMYD TRACH DNA AMP PROBE: CPT | Performed by: PHYSICIAN ASSISTANT

## 2018-11-04 RX ORDER — METRONIDAZOLE 500 MG/1
500 TABLET ORAL 2 TIMES DAILY
Qty: 14 TAB | Refills: 0 | Status: SHIPPED | OUTPATIENT
Start: 2018-11-04 | End: 2018-11-11

## 2018-11-04 RX ORDER — FLUCONAZOLE 100 MG/1
150 TABLET ORAL
Status: COMPLETED | OUTPATIENT
Start: 2018-11-04 | End: 2018-11-04

## 2018-11-04 RX ORDER — AZITHROMYCIN 250 MG/1
1000 TABLET, FILM COATED ORAL
Status: COMPLETED | OUTPATIENT
Start: 2018-11-04 | End: 2018-11-04

## 2018-11-04 RX ADMIN — LIDOCAINE HYDROCHLORIDE 250 MG: 10 INJECTION, SOLUTION EPIDURAL; INFILTRATION; INTRACAUDAL; PERINEURAL at 12:45

## 2018-11-04 RX ADMIN — AZITHROMYCIN 1000 MG: 250 TABLET, FILM COATED ORAL at 12:43

## 2018-11-04 RX ADMIN — FLUCONAZOLE 150 MG: 100 TABLET ORAL at 12:44

## 2018-11-04 NOTE — DISCHARGE INSTRUCTIONS
Bacterial Vaginosis: Care Instructions  Your Care Instructions    Bacterial vaginosis is a type of vaginal infection. It is caused by excess growth of certain bacteria that are normally found in the vagina. Symptoms can include itching, swelling, pain when you urinate or have sex, and a gray or yellow discharge with a \"fishy\" odor. It is not considered an infection that is spread through sexual contact. Although symptoms can be annoying and uncomfortable, bacterial vaginosis does not usually cause other health problems. However, if you have it while you are pregnant, it can cause complications. While the infection may go away on its own, most doctors use antibiotics to treat it. You may have been prescribed pills or vaginal cream. With treatment, bacterial vaginosis usually clears up in 5 to 7 days. Follow-up care is a key part of your treatment and safety. Be sure to make and go to all appointments, and call your doctor if you are having problems. It's also a good idea to know your test results and keep a list of the medicines you take. How can you care for yourself at home? · Take your antibiotics as directed. Do not stop taking them just because you feel better. You need to take the full course of antibiotics. · Do not eat or drink anything that contains alcohol if you are taking metronidazole (Flagyl). · Keep using your medicine if you start your period. Use pads instead of tampons while using a vaginal cream or suppository. Tampons can absorb the medicine. · Wear loose cotton clothing. Do not wear nylon and other materials that hold body heat and moisture close to the skin. · Do not scratch. Relieve itching with a cold pack or a cool bath. · Do not wash your vaginal area more than once a day. Use plain water or a mild, unscented soap. Do not douche. When should you call for help?   Watch closely for changes in your health, and be sure to contact your doctor if:    · You have unexpected vaginal bleeding.     · You have a fever.     · You have new or increased pain in your vagina or pelvis.     · You are not getting better after 1 week.     · Your symptoms return after you finish the course of your medicine. Where can you learn more? Go to http://venecia-trudy.info/. Mckenzie Hernandez in the search box to learn more about \"Bacterial Vaginosis: Care Instructions. \"  Current as of: May 15, 2018  Content Version: 11.8  © 2043-7231 [employer]. Care instructions adapted under license by [email or web address] (which disclaims liability or warranty for this information). If you have questions about a medical condition or this instruction, always ask your healthcare professional. [address] any warranty or liability for your use of this information.

## 2018-11-04 NOTE — ED TRIAGE NOTES
Patient reports vaginal discharge and pelvic pain that shoots to her back, with vaginal irritation. Patient reports being treated for STI approximately 3-4 weeks ago.

## 2018-11-04 NOTE — ED PROVIDER NOTES
The history is provided by the patient. Vaginal Discharge    This is a new problem. The current episode started more than 2 days ago. The problem occurs constantly. The problem has not changed since onset. The discharge occurs at rest. The discharge was white and malodorous. Pertinent negatives include no fever, no abdominal pain, no constipation, no diarrhea, no nausea and no vomiting. Risk factors include history of STDs and multiple sexual partners. She has tried nothing for the symptoms. The treatment provided no relief. Past Medical History:   Diagnosis Date    Genital herpes        History reviewed. No pertinent surgical history.       Family History:   Problem Relation Age of Onset    No Known Problems Mother     Hypertension Father     Hypertension Paternal Grandmother     Stroke Paternal Grandmother     Hypertension Paternal Grandfather        Social History     Socioeconomic History    Marital status: SINGLE     Spouse name: Not on file    Number of children: Not on file    Years of education: Not on file    Highest education level: Not on file   Social Needs    Financial resource strain: Not on file    Food insecurity - worry: Not on file    Food insecurity - inability: Not on file   [place] [facility] needs - medical: Not on file   [employer] needs - non-medical: Not on file   Occupational History    Not on file   Tobacco Use    Smoking status: Current Some Day Smoker     Packs/day: 0.25     Last attempt to quit: 2017     Years since quittin.1    Smokeless tobacco: Never Used   Substance and Sexual Activity    Alcohol use: Yes     Comment: Occasionally    Drug use: Yes     Types: Marijuana     Comment: smokes daily, last time was a week ago (10/9/17)    Sexual activity: Yes     Partners: Male     Birth control/protection: None   Other Topics Concern    Not on file   Social History Narrative    Not on file         ALLERGIES: Patient has no known allergies. Review of Systems   Constitutional: Negative for chills and fever. HENT: Negative. Eyes: Negative. Respiratory: Negative for cough and shortness of breath. Cardiovascular: Negative for chest pain and palpitations. Gastrointestinal: Negative for abdominal pain, constipation, diarrhea, nausea and vomiting. Genitourinary: Positive for vaginal discharge. Musculoskeletal: Negative for back pain and neck pain. Skin: Negative. Allergic/Immunologic: Negative. Neurological: Negative for dizziness, weakness, numbness and headaches. Psychiatric/Behavioral: Negative. All other systems reviewed and are negative. Vitals:    11/04/18 1008 11/04/18 1009 11/04/18 1249   BP:  154/78 (!) 137/94   Pulse: 72  63   Resp: 18  18   Temp: 98 °F (36.7 °C)     SpO2: 100%  100%   Weight: 108.9 kg (240 lb)     Height: 5' 7\" (1.702 m)              Physical Exam   Constitutional: She is oriented to person, place, and time. She appears well-developed and well-nourished. No distress. NAD, well hydrated, non toxic     HENT:   Head: Normocephalic and atraumatic. Nose: Nose normal.   Mouth/Throat: Oropharynx is clear and moist. No oropharyngeal exudate. Eyes: Conjunctivae and EOM are normal. Pupils are equal, round, and reactive to light. Neck: Normal range of motion. Neck supple. Cardiovascular: Normal rate, regular rhythm and normal heart sounds. No murmur heard. Pulmonary/Chest: Effort normal and breath sounds normal. No respiratory distress. She has no wheezes. She has no rales. Abdominal: Soft. She exhibits no distension. There is no tenderness. There is no guarding. Genitourinary: Vaginal discharge found. Musculoskeletal: Normal range of motion. Lymphadenopathy:     She has no cervical adenopathy. Neurological: She is alert and oriented to person, place, and time. No cranial nerve deficit. Coordination normal.   Skin: Skin is warm. No rash noted. She is not diaphoretic. Psychiatric: She has a normal mood and affect. Her behavior is normal.   Nursing note and vitals reviewed.        MDM  Number of Diagnoses or Management Options  BV (bacterial vaginosis):   Yeast vaginitis:   Diagnosis management comments: BV and yeast noted in wet prep  Rocephin and azithromycin given in ED  Urine and urine preg normal       Amount and/or Complexity of Data Reviewed  Clinical lab tests: ordered and reviewed           Pelvic Exam  Date/Time: 11/4/2018 12:55 PM  Type of exam performed: bimanual.

## 2019-06-13 ENCOUNTER — HOSPITAL ENCOUNTER (EMERGENCY)
Dept: ULTRASOUND IMAGING | Age: 24
Discharge: HOME | End: 2019-06-13
Attending: EMERGENCY MEDICINE
Payer: MEDICAID

## 2019-06-13 ENCOUNTER — HOSPITAL ENCOUNTER (EMERGENCY)
Age: 24
Discharge: HOME | End: 2019-06-13
Attending: EMERGENCY MEDICINE
Payer: MEDICAID

## 2019-06-13 VITALS
HEART RATE: 62 BPM | DIASTOLIC BLOOD PRESSURE: 79 MMHG | TEMPERATURE: 97.5 F | RESPIRATION RATE: 18 BRPM | OXYGEN SATURATION: 100 % | SYSTOLIC BLOOD PRESSURE: 116 MMHG

## 2019-06-13 DIAGNOSIS — O26.899 ABDOMINAL PAIN AFFECTING PREGNANCY: Primary | ICD-10-CM

## 2019-06-13 DIAGNOSIS — R10.9 ABDOMINAL PAIN AFFECTING PREGNANCY: Primary | ICD-10-CM

## 2019-06-13 DIAGNOSIS — N76.0 BV (BACTERIAL VAGINOSIS): ICD-10-CM

## 2019-06-13 DIAGNOSIS — B96.89 BV (BACTERIAL VAGINOSIS): ICD-10-CM

## 2019-06-13 LAB
ABO + RH BLD: NORMAL
ALBUMIN SERPL-MCNC: 3.7 G/DL (ref 3.4–5)
ALBUMIN/GLOB SERPL: 1.1 {RATIO} (ref 0.8–1.7)
ALP SERPL-CCNC: 56 U/L (ref 45–117)
ALT SERPL-CCNC: 17 U/L (ref 13–56)
ANION GAP SERPL CALC-SCNC: 6 MMOL/L (ref 3–18)
APPEARANCE UR: ABNORMAL
AST SERPL-CCNC: 17 U/L (ref 15–37)
BASOPHILS # BLD: 0 K/UL (ref 0–0.1)
BASOPHILS NFR BLD: 0 % (ref 0–2)
BILIRUB SERPL-MCNC: 0.3 MG/DL (ref 0.2–1)
BILIRUB UR QL: NEGATIVE
BUN SERPL-MCNC: 7 MG/DL (ref 7–18)
BUN/CREAT SERPL: 8 (ref 12–20)
CALCIUM SERPL-MCNC: 8.6 MG/DL (ref 8.5–10.1)
CHLORIDE SERPL-SCNC: 108 MMOL/L (ref 100–108)
CO2 SERPL-SCNC: 26 MMOL/L (ref 21–32)
COLOR UR: YELLOW
CREAT SERPL-MCNC: 0.83 MG/DL (ref 0.6–1.3)
DIFFERENTIAL METHOD BLD: ABNORMAL
EOSINOPHIL # BLD: 0.1 K/UL (ref 0–0.4)
EOSINOPHIL NFR BLD: 1 % (ref 0–5)
ERYTHROCYTE [DISTWIDTH] IN BLOOD BY AUTOMATED COUNT: 14 % (ref 11.6–14.5)
GLOBULIN SER CALC-MCNC: 3.4 G/DL (ref 2–4)
GLUCOSE SERPL-MCNC: 82 MG/DL (ref 74–99)
GLUCOSE UR STRIP.AUTO-MCNC: NEGATIVE MG/DL
HCG SERPL-ACNC: ABNORMAL MIU/ML (ref 0–10)
HCT VFR BLD AUTO: 35.7 % (ref 35–45)
HGB BLD-MCNC: 11.8 G/DL (ref 12–16)
HGB UR QL STRIP: NEGATIVE
KETONES UR QL STRIP.AUTO: >160 MG/DL
LEUKOCYTE ESTERASE UR QL STRIP.AUTO: NEGATIVE
LYMPHOCYTES # BLD: 3.6 K/UL (ref 0.9–3.6)
LYMPHOCYTES NFR BLD: 32 % (ref 21–52)
MCH RBC QN AUTO: 26.8 PG (ref 24–34)
MCHC RBC AUTO-ENTMCNC: 33.1 G/DL (ref 31–37)
MCV RBC AUTO: 81.1 FL (ref 74–97)
MONOCYTES # BLD: 0.6 K/UL (ref 0.05–1.2)
MONOCYTES NFR BLD: 6 % (ref 3–10)
NEUTS SEG # BLD: 6.8 K/UL (ref 1.8–8)
NEUTS SEG NFR BLD: 61 % (ref 40–73)
NITRITE UR QL STRIP.AUTO: NEGATIVE
PH UR STRIP: 6.5 [PH] (ref 5–8)
PLATELET # BLD AUTO: 284 K/UL (ref 135–420)
PMV BLD AUTO: 9.2 FL (ref 9.2–11.8)
POTASSIUM SERPL-SCNC: 3.7 MMOL/L (ref 3.5–5.5)
PROT SERPL-MCNC: 7.1 G/DL (ref 6.4–8.2)
PROT UR STRIP-MCNC: NEGATIVE MG/DL
RBC # BLD AUTO: 4.4 M/UL (ref 4.2–5.3)
SERVICE CMNT-IMP: NORMAL
SODIUM SERPL-SCNC: 140 MMOL/L (ref 136–145)
SP GR UR REFRACTOMETRY: 1.02 (ref 1–1.03)
UROBILINOGEN UR QL STRIP.AUTO: 1 EU/DL (ref 0.2–1)
WBC # BLD AUTO: 11.1 K/UL (ref 4.6–13.2)
WET PREP GENITAL: NORMAL

## 2019-06-13 PROCEDURE — 76830 TRANSVAGINAL US NON-OB: CPT

## 2019-06-13 PROCEDURE — 74011250637 HC RX REV CODE- 250/637: Performed by: EMERGENCY MEDICINE

## 2019-06-13 PROCEDURE — 81003 URINALYSIS AUTO W/O SCOPE: CPT

## 2019-06-13 PROCEDURE — 86900 BLOOD TYPING SEROLOGIC ABO: CPT

## 2019-06-13 PROCEDURE — 85025 COMPLETE CBC W/AUTO DIFF WBC: CPT

## 2019-06-13 PROCEDURE — 80053 COMPREHEN METABOLIC PANEL: CPT

## 2019-06-13 PROCEDURE — 99283 EMERGENCY DEPT VISIT LOW MDM: CPT

## 2019-06-13 PROCEDURE — 87210 SMEAR WET MOUNT SALINE/INK: CPT

## 2019-06-13 PROCEDURE — 93975 VASCULAR STUDY: CPT

## 2019-06-13 PROCEDURE — 84702 CHORIONIC GONADOTROPIN TEST: CPT

## 2019-06-13 PROCEDURE — 87491 CHLMYD TRACH DNA AMP PROBE: CPT

## 2019-06-13 RX ORDER — ACETAMINOPHEN 325 MG/1
650 TABLET ORAL
Status: COMPLETED | OUTPATIENT
Start: 2019-06-13 | End: 2019-06-13

## 2019-06-13 RX ORDER — METRONIDAZOLE 500 MG/1
500 TABLET ORAL 2 TIMES DAILY
Qty: 14 TAB | Refills: 0 | Status: SHIPPED | OUTPATIENT
Start: 2019-06-13 | End: 2019-06-20

## 2019-06-13 RX ADMIN — ACETAMINOPHEN 650 MG: 325 TABLET ORAL at 03:05

## 2019-06-13 NOTE — ED NOTES
Bedside shift report received from Aleksandr Gamble RN. Assumed care of the patient. Received patient resting on stretcher with eyes closed. Respirations regular and unlabored. Responsive to verbal commands. Awaiting US results. Explanation of wait provided to the patient. Will continue to monitor.

## 2019-06-13 NOTE — ED TRIAGE NOTES
Patient A/O x 4, complaining of right sided groin pain x 1 hour. Patient denies vaginal bleeding, discharge or other complaints.

## 2019-06-13 NOTE — ED PROVIDER NOTES
EMERGENCY DEPARTMENT HISTORY AND PHYSICAL EXAM    2:11 AM      Date: 2019  Patient Name: Lenore Calles    History of Presenting Illness     Chief Complaint   Patient presents with    Groin Pain     right         History Provided By: Patient    Additional History (Context): Marco Mcmullen is a 21 y.o. female  A1 at about 6 weeks by dates, who presents with complaint of 2 days of intermittent, sharp, severe suprapubic pain and increased vaginal discharge. She denies any dysuria or hematuria, vaginal bleeding, new vomiting, fever or other associated symptoms. PCP: Skylar Duval CNM    Current Outpatient Medications   Medication Sig Dispense Refill    fluconazole (DIFLUCAN) 150 mg tablet Take at end of antibiotic course, if needed. 1 Tab 0    valACYclovir (VALTREX) 1 gram tablet Take  by mouth.  ferrous fumarate 324 mg (106 mg iron) tab Take 1 Tab by mouth two (2) times a day. 60 Tab 5    AMBULATORY BREAST PUMP Use as directed for breast feeding 1 Pump(s) 0    AMBULATORY BREAST PUMP Use breast pump as directed for breast feeding 1 Pump(s) 0    prenatal vit-iron fumarate-fa (PRENATAL VITAMIN) 27-0.8 mg tab tablet Take 1 Tab by mouth daily. 61 Tab 5       Past History     Past Medical History:  Past Medical History:   Diagnosis Date    Genital herpes        Past Surgical History:  No past surgical history on file.     Family History:  Family History   Problem Relation Age of Onset    No Known Problems Mother     Hypertension Father     Hypertension Paternal Grandmother     Stroke Paternal Grandmother     Hypertension Paternal Grandfather        Social History:  Social History     Tobacco Use    Smoking status: Current Some Day Smoker     Packs/day: 0.25     Last attempt to quit: 2017     Years since quittin.7    Smokeless tobacco: Never Used   Substance Use Topics    Alcohol use: Yes     Comment: Occasionally    Drug use: Yes     Types: Marijuana Comment: smokes daily, last time was a week ago (10/9/17)       Allergies:  No Known Allergies      Review of Systems       Review of Systems   Constitutional: Negative for activity change and appetite change. HENT: Negative for congestion. Eyes: Negative for visual disturbance. Respiratory: Negative for cough and shortness of breath. Cardiovascular: Negative for chest pain. Gastrointestinal: Negative for abdominal pain, diarrhea, nausea and vomiting. Genitourinary: Positive for pelvic pain and vaginal discharge. Negative for dysuria. Musculoskeletal: Negative for arthralgias and myalgias. Skin: Negative for rash. Neurological: Negative for weakness and numbness. Physical Exam     Visit Vitals  /79   Pulse 62   Temp 97.5 °F (36.4 °C)   Resp 18   LMP 05/01/2019   SpO2 100%         Physical Exam   Constitutional: She is oriented to person, place, and time. She appears well-developed and well-nourished. HENT:   Head: Normocephalic and atraumatic. Mouth/Throat: Oropharynx is clear and moist.   Eyes: Conjunctivae are normal.   Neck: Normal range of motion. Neck supple. No JVD present. Cardiovascular: Normal rate, regular rhythm, normal heart sounds and intact distal pulses. No murmur heard. Pulmonary/Chest: Effort normal and breath sounds normal.   Abdominal: Soft. Bowel sounds are normal. She exhibits no distension. Mild suprapubic tenderness   Musculoskeletal: Normal range of motion. She exhibits no deformity. Lymphadenopathy:     She has no cervical adenopathy. Neurological: She is alert and oriented to person, place, and time. Coordination normal.   Skin: Skin is warm and dry. No rash noted. Psychiatric: She has a normal mood and affect. Nursing note and vitals reviewed.         Diagnostic Study Results     Labs -  Recent Results (from the past 12 hour(s))   CBC WITH AUTOMATED DIFF    Collection Time: 06/13/19  2:45 AM   Result Value Ref Range    WBC 11.1 4.6 - 13.2 K/uL    RBC 4.40 4.20 - 5.30 M/uL    HGB 11.8 (L) 12.0 - 16.0 g/dL    HCT 35.7 35.0 - 45.0 %    MCV 81.1 74.0 - 97.0 FL    MCH 26.8 24.0 - 34.0 PG    MCHC 33.1 31.0 - 37.0 g/dL    RDW 14.0 11.6 - 14.5 %    PLATELET 464 789 - 772 K/uL    MPV 9.2 9.2 - 11.8 FL    NEUTROPHILS 61 40 - 73 %    LYMPHOCYTES 32 21 - 52 %    MONOCYTES 6 3 - 10 %    EOSINOPHILS 1 0 - 5 %    BASOPHILS 0 0 - 2 %    ABS. NEUTROPHILS 6.8 1.8 - 8.0 K/UL    ABS. LYMPHOCYTES 3.6 0.9 - 3.6 K/UL    ABS. MONOCYTES 0.6 0.05 - 1.2 K/UL    ABS. EOSINOPHILS 0.1 0.0 - 0.4 K/UL    ABS. BASOPHILS 0.0 0.0 - 0.1 K/UL    DF AUTOMATED     METABOLIC PANEL, COMPREHENSIVE    Collection Time: 06/13/19  2:45 AM   Result Value Ref Range    Sodium 140 136 - 145 mmol/L    Potassium 3.7 3.5 - 5.5 mmol/L    Chloride 108 100 - 108 mmol/L    CO2 26 21 - 32 mmol/L    Anion gap 6 3.0 - 18 mmol/L    Glucose 82 74 - 99 mg/dL    BUN 7 7.0 - 18 MG/DL    Creatinine 0.83 0.6 - 1.3 MG/DL    BUN/Creatinine ratio 8 (L) 12 - 20      GFR est AA >60 >60 ml/min/1.73m2    GFR est non-AA >60 >60 ml/min/1.73m2    Calcium 8.6 8.5 - 10.1 MG/DL    Bilirubin, total 0.3 0.2 - 1.0 MG/DL    ALT (SGPT) 17 13 - 56 U/L    AST (SGOT) 17 15 - 37 U/L    Alk.  phosphatase 56 45 - 117 U/L    Protein, total 7.1 6.4 - 8.2 g/dL    Albumin 3.7 3.4 - 5.0 g/dL    Globulin 3.4 2.0 - 4.0 g/dL    A-G Ratio 1.1 0.8 - 1.7     BETA HCG, QT    Collection Time: 06/13/19  2:45 AM   Result Value Ref Range    Beta HCG, QT 22,756 (H) 0 - 10 MIU/ML   BLOOD TYPE, (ABO+RH)    Collection Time: 06/13/19  2:45 AM   Result Value Ref Range    ABO/Rh(D) A POSITIVE    WET PREP    Collection Time: 06/13/19  2:53 AM   Result Value Ref Range    Special Requests: NO SPECIAL REQUESTS      Wet prep FEW  CLUE CELLS PRESENT        Wet prep NO TRICHOMONAS SEEN      Wet prep NO YEAST SEEN     URINALYSIS W/ RFLX MICROSCOPIC    Collection Time: 06/13/19  4:28 AM   Result Value Ref Range    Color YELLOW      Appearance CLOUDY      Specific gravity 1.022 1.005 - 1.030      pH (UA) 6.5 5.0 - 8.0      Protein NEGATIVE  NEG mg/dL    Glucose NEGATIVE  NEG mg/dL    Ketone >160 (A) NEG mg/dL    Bilirubin NEGATIVE  NEG      Blood NEGATIVE  NEG      Urobilinogen 1.0 0.2 - 1.0 EU/dL    Nitrites NEGATIVE  NEG      Leukocyte Esterase NEGATIVE  NEG         Radiologic Studies -   US TRANSVAGINAL    (Results Pending)         Medical Decision Making   I am the first provider for this patient. I reviewed the vital signs, available nursing notes, past medical history, past surgical history, family history and social history. Vital Signs-Reviewed the patient's vital signs. Records Reviewed: Nursing Notes (Time of Review: 2:11 AM)      Provider Notes (Medical Decision Making): Aruna Gutiérrez is a 21 y.o. female  A1 at about 6 weeks by dates, who presents with complaint of 2 days of intermittent, sharp, severe suprapubic pain and increased vaginal discharge. She denies any dysuria or hematuria, vaginal bleeding, new vomiting, fever or other associated symptoms. She does have some mild suprapubic tenderness. Differential Diagnosis: We will rule out ectopic pregnancy, will also evaluate for possible pelvic infection, low suspicion for PID, urinary tract infection, low suspicion for other intra-abdominal infectious, obstructive or ischemic etiology. Testing: CBC, CMP, blood type, quantitative hCG, pelvic ultrasound, GC chlamydia, wet prep  Treatments: Tylenol    Re-evaluations:  Patient's labs unremarkable. Few clue cells, will treat with Flagyl. Currently pending pelvic ultrasound results. Diagnosis     Clinical Impression:   1. Abdominal pain affecting pregnancy        Disposition: 6:56 AM : Pt care transferred to Dr. Jenifer Reyez  ,ED provider. History of patient complaint(s), available diagnostic reports and current treatment plan has been discussed thoroughly. Bedside rounding on patient occured : yes .   Intended disposition of patient : Home  Pending diagnostics reports and/or labs (please list): pending pelvic ultrasound      Follow-up Information     Follow up With Specialties Details Why Contact Info    Eryn Ross CNM Certified Nurse Midwife Schedule an appointment as soon as possible for a visit  [phone] [facility] EMERGENCY DEPT Emergency Medicine  If symptoms worsen [address] [id number]  [phone]           Patient's Medications   Start Taking    No medications on file   Continue Taking    AMBULATORY BREAST PUMP    Use breast pump as directed for breast feeding    AMBULATORY BREAST PUMP    Use as directed for breast feeding    FERROUS FUMARATE 324 MG (106 MG IRON) TAB    Take 1 Tab by mouth two (2) times a day. FLUCONAZOLE (DIFLUCAN) 150 MG TABLET    Take at end of antibiotic course, if needed. PRENATAL VIT-IRON FUMARATE-FA (PRENATAL VITAMIN) 27-0.8 MG TAB TABLET    Take 1 Tab by mouth daily. VALACYCLOVIR (VALTREX) 1 GRAM TABLET    Take  by mouth. These Medications have changed    No medications on file   Stop Taking    No medications on file     _______________________________    Attestations:  Livia Lawson MD acting as a scribe for and in the presence of Aniya Anderson MD      June 13, 2019 at 6:57 AM       Provider Attestation:      I personally performed the services described in the documentation, reviewed the documentation, as recorded by the scribe in my presence, and it accurately and completely records my words and actions.  June 13, 2019 at 6:57 AM - Aniya Anderson MD    _______________________________

## 2019-06-13 NOTE — DISCHARGE INSTRUCTIONS
Patient Education     Belly Pain in Pregnancy: Care Instructions  Your Care Instructions  When you're pregnant, any belly pain can be a worry. You may not want to call your doctor about every pain you have. But you don't want to miss something that is dangerous for you or your baby. Even if it feels familiar, belly pain can mean something new when you're pregnant. It's important to know when to call your doctor. It will also help to know how to care for yourself at home when your pain is not caused by anything harmful. · When belly pain is more severe or constant, see a doctor right away. · If you're sure your belly pain is a sign of labor, call your doctor. · When belly pain is brief, it's usually a normal part of pregnancy. It might be related to changes in the growing uterus. Or it could be the stretching of ligaments called round ligaments. These ligaments help support the uterus. Round ligament pain can be on either side of your belly. It can also be felt in your hips or groin. Follow-up care is a key part of your treatment and safety. Be sure to make and go to all appointments, and call your doctor if you are having problems. It's also a good idea to know your test results and keep a list of the medicines you take. How can you tell if belly pain is a sign of labor? When belly pain is caused by labor, it can feel like mild or menstrual-like cramps in your lower belly. These cramps are probably contractions. They can happen in your second or third trimester. You may also have:  · A steady, dull ache in your lower back, pelvis, or thighs. · A feeling of pressure in your pelvis or lower belly. · Changes in your vaginal discharge or a sudden release of fluid from the vagina. If you think you are in labor, call your doctor. How can you care for yourself at home? When belly pain is mild and is not a symptom of labor:  · Rest until you feel better. · Take a warm bath.   · Think about what you drink and eat:  ¨ Drink plenty of fluids. Choose water and other caffeine-free clear liquids until you feel better. ¨ Try eating small, frequent meals. If your stomach is upset, try bland, low-fat foods like plain rice, broiled chicken, toast, and yogurt. · Think about how you move if you are having brief pains from stretching of the round ligaments. ¨ Try gentle stretching. ¨ Move a little more slowly when turning in bed or getting up from a chair, so those ligaments don't stretch quickly. ¨ Lean forward a bit if you think you are going to cough or sneeze. When should you call for help? Call 911 anytime you think you may need emergency care. For example, call if:  · You have sudden, severe pain in your belly. · You have severe vaginal bleeding. Call your doctor now or seek immediate medical care if:  · You have new or worse belly pain or cramping. · You have any vaginal bleeding. · You have a fever. · You have symptoms of preeclampsia, such as:  ¨ Sudden swelling of your face, hands, or feet. ¨ New vision problems (such as dimness or blurring). ¨ A severe headache. · You think that you may be in labor. This means that you've had at least 8 contractions within 1 hour or at least 4 contractions within 20 minutes, even after you change your position and drink fluids. · You have symptoms of a urinary tract infection. These may include:  ¨ Pain or burning when you urinate. ¨ A frequent need to urinate without being able to pass much urine. ¨ Pain in the flank, which is just below the rib cage and above the waist on either side of the back. ¨ Blood in your urine. Watch closely for changes in your health, and be sure to contact your doctor if you are worried about your or your baby's health. Where can you learn more? Go to [employer]  Enter B275 in the search box to learn more about \"Belly Pain in Pregnancy: Care Instructions. \"   © 7278-9693 Healthwise, Incorporated.  Care instructions adapted under license by [facility] (which disclaims liability or warranty for this information). This care instruction is for use with your licensed healthcare professional. If you have questions about a medical condition or this instruction, always ask your healthcare professional. [address] any warranty or liability for your use of this information.   Content Version: 43.2.051785; Current as of: November 12, 2015

## 2019-06-13 NOTE — ED NOTES
7:00 AM :Pt care assumed from Dr. Sincere Ashley, ED provider. Pt complaint(s), current treatment plan, progression and available diagnostic results have been discussed thoroughly. Rounding occurred: Yes  Intended Disposition: Discharge  Pending diagnostic reports and/or labs (please list): US read    TV US: IMPRESSION:    Single viable intrauterine gestation measures 6 weeks 0 days by crown-rump  length with heart rate of 110 BPM. Cardiac activity is faint likely due to early  age. Consider short-term sonographic follow-up to confirm viability. Small subchorionic hemorrhage. Trace free fluid, nonspecific.    9:00 AM: Patient appears well. No abdominal pain at this time. Ultrasound shows a single intrauterine pregnancy with faint FHR. She understands the results of her US and uncertain viability of the fetus. Will treat her BV with Flagyl and have her follow-up with OB.

## 2020-01-30 PROBLEM — O99.820 GBS (GROUP B STREPTOCOCCUS CARRIER), +RV CULTURE, CURRENTLY PREGNANT: Status: ACTIVE | Noted: 2020-01-30

## 2020-01-30 PROBLEM — Z34.90 ENCOUNTER FOR ELECTIVE INDUCTION OF LABOR: Status: ACTIVE | Noted: 2020-01-30

## 2022-03-19 PROBLEM — Z34.90 PREGNANCY: Status: ACTIVE | Noted: 2017-12-28

## 2022-03-19 PROBLEM — O99.820 GBS (GROUP B STREPTOCOCCUS CARRIER), +RV CULTURE, CURRENTLY PREGNANT: Status: ACTIVE | Noted: 2020-01-30

## 2022-03-19 PROBLEM — Z34.90 ENCOUNTER FOR ELECTIVE INDUCTION OF LABOR: Status: ACTIVE | Noted: 2020-01-30

## 2022-04-23 ENCOUNTER — HOSPITAL ENCOUNTER (EMERGENCY)
Age: 27
Discharge: HOME | End: 2022-04-23
Attending: STUDENT IN AN ORGANIZED HEALTH CARE EDUCATION/TRAINING PROGRAM
Payer: MEDICAID

## 2022-04-23 VITALS
RESPIRATION RATE: 18 BRPM | OXYGEN SATURATION: 100 % | WEIGHT: 260 LBS | TEMPERATURE: 98.3 F | HEART RATE: 76 BPM | BODY MASS INDEX: 39.4 KG/M2 | SYSTOLIC BLOOD PRESSURE: 147 MMHG | HEIGHT: 68 IN | DIASTOLIC BLOOD PRESSURE: 108 MMHG

## 2022-04-23 DIAGNOSIS — N76.0 BV (BACTERIAL VAGINOSIS): Primary | ICD-10-CM

## 2022-04-23 DIAGNOSIS — B96.89 BV (BACTERIAL VAGINOSIS): Primary | ICD-10-CM

## 2022-04-23 LAB
APPEARANCE UR: ABNORMAL
BILIRUB UR QL: NEGATIVE
COLOR UR: YELLOW
GLUCOSE UR STRIP.AUTO-MCNC: NEGATIVE MG/DL
HCG UR QL: NEGATIVE
HGB UR QL STRIP: NEGATIVE
KETONES UR QL STRIP.AUTO: ABNORMAL MG/DL
LEUKOCYTE ESTERASE UR QL STRIP.AUTO: NEGATIVE
NITRITE UR QL STRIP.AUTO: NEGATIVE
PH UR STRIP: 6 [PH] (ref 5–8)
PROT UR STRIP-MCNC: NEGATIVE MG/DL
SERVICE CMNT-IMP: NORMAL
SP GR UR REFRACTOMETRY: >1.03 (ref 1–1.03)
UROBILINOGEN UR QL STRIP.AUTO: 1 EU/DL (ref 0.2–1)
WET PREP GENITAL: NORMAL

## 2022-04-23 PROCEDURE — 99283 EMERGENCY DEPT VISIT LOW MDM: CPT

## 2022-04-23 PROCEDURE — 87210 SMEAR WET MOUNT SALINE/INK: CPT

## 2022-04-23 PROCEDURE — 81003 URINALYSIS AUTO W/O SCOPE: CPT

## 2022-04-23 PROCEDURE — 81025 URINE PREGNANCY TEST: CPT

## 2022-04-23 PROCEDURE — 87491 CHLMYD TRACH DNA AMP PROBE: CPT

## 2022-04-23 RX ORDER — METRONIDAZOLE 500 MG/1
500 TABLET ORAL 2 TIMES DAILY
Qty: 14 TABLET | Refills: 0 | Status: SHIPPED | OUTPATIENT
Start: 2022-04-23 | End: 2022-04-30

## 2022-04-23 NOTE — ED TRIAGE NOTES
Pt endorses pelvic pain and vaginal d/c, states her period is 2 days late, her periods are normally regular, thinks she has BV.

## 2022-07-12 NOTE — PATIENT INSTRUCTIONS

## 2023-09-29 NOTE — PROGRESS NOTES
Suspected fetal demise. Please have patient schedule visit today to discuss US report, plan of care and beta HCG.
normal/normal affect/alert and oriented x3/normal behavior

## 2024-04-12 ENCOUNTER — HOSPITAL ENCOUNTER (EMERGENCY)
Facility: HOSPITAL | Age: 29
Discharge: HOME | End: 2024-04-13
Attending: EMERGENCY MEDICINE | Admitting: EMERGENCY MEDICINE
Payer: MEDICAID

## 2024-04-12 DIAGNOSIS — R10.30 LOWER ABDOMINAL PAIN: Primary | ICD-10-CM

## 2024-04-12 DIAGNOSIS — N39.0 ACUTE UTI: ICD-10-CM

## 2024-04-12 LAB
APPEARANCE UR: ABNORMAL
BACTERIA URNS QL MICRO: ABNORMAL /HPF
BILIRUB UR QL: NEGATIVE
COLOR UR: YELLOW
EPITH CASTS URNS QL MICRO: ABNORMAL /LPF (ref 0–5)
GLUCOSE UR STRIP.AUTO-MCNC: NEGATIVE MG/DL
HCG UR QL: NEGATIVE
HGB UR QL STRIP: NEGATIVE
KETONES UR QL STRIP.AUTO: NEGATIVE MG/DL
LEUKOCYTE ESTERASE UR QL STRIP.AUTO: ABNORMAL
NITRITE UR QL STRIP.AUTO: NEGATIVE
PH UR STRIP: 7 (ref 5–8)
PROT UR STRIP-MCNC: NEGATIVE MG/DL
RBC #/AREA URNS HPF: NEGATIVE /HPF (ref 0–5)
SP GR UR REFRACTOMETRY: 1.02 (ref 1–1.03)
UROBILINOGEN UR QL STRIP.AUTO: 1 EU/DL (ref 0.2–1)
WBC URNS QL MICRO: ABNORMAL /HPF (ref 0–5)

## 2024-04-12 PROCEDURE — 81025 URINE PREGNANCY TEST: CPT

## 2024-04-12 PROCEDURE — 96374 THER/PROPH/DIAG INJ IV PUSH: CPT

## 2024-04-12 PROCEDURE — 87591 N.GONORRHOEAE DNA AMP PROB: CPT

## 2024-04-12 PROCEDURE — 87661 TRICHOMONAS VAGINALIS AMPLIF: CPT

## 2024-04-12 PROCEDURE — 87491 CHLMYD TRACH DNA AMP PROBE: CPT

## 2024-04-12 PROCEDURE — 81001 URINALYSIS AUTO W/SCOPE: CPT

## 2024-04-12 PROCEDURE — 96375 TX/PRO/DX INJ NEW DRUG ADDON: CPT

## 2024-04-12 PROCEDURE — 99285 EMERGENCY DEPT VISIT HI MDM: CPT

## 2024-04-12 RX ORDER — KETOROLAC TROMETHAMINE 15 MG/ML
30 INJECTION, SOLUTION INTRAMUSCULAR; INTRAVENOUS
Status: COMPLETED | OUTPATIENT
Start: 2024-04-13 | End: 2024-04-13

## 2024-04-12 RX ORDER — ONDANSETRON 2 MG/ML
4 INJECTION INTRAMUSCULAR; INTRAVENOUS
Status: COMPLETED | OUTPATIENT
Start: 2024-04-13 | End: 2024-04-13

## 2024-04-12 ASSESSMENT — PAIN - FUNCTIONAL ASSESSMENT: PAIN_FUNCTIONAL_ASSESSMENT: 0-10

## 2024-04-12 ASSESSMENT — PAIN SCALES - GENERAL: PAINLEVEL_OUTOF10: 9

## 2024-04-12 ASSESSMENT — PAIN DESCRIPTION - LOCATION: LOCATION: ABDOMEN

## 2024-04-13 ENCOUNTER — APPOINTMENT (OUTPATIENT)
Facility: HOSPITAL | Age: 29
End: 2024-04-13
Payer: MEDICAID

## 2024-04-13 VITALS
DIASTOLIC BLOOD PRESSURE: 95 MMHG | WEIGHT: 260 LBS | SYSTOLIC BLOOD PRESSURE: 135 MMHG | TEMPERATURE: 97.2 F | OXYGEN SATURATION: 96 % | HEART RATE: 56 BPM | HEIGHT: 67 IN | RESPIRATION RATE: 16 BRPM | BODY MASS INDEX: 40.81 KG/M2

## 2024-04-13 LAB
ALBUMIN SERPL-MCNC: 3.5 G/DL (ref 3.4–5)
ALBUMIN/GLOB SERPL: 1.2 (ref 0.8–1.7)
ALP SERPL-CCNC: 58 U/L (ref 45–117)
ALT SERPL-CCNC: 17 U/L (ref 13–56)
ANION GAP SERPL CALC-SCNC: 3 MMOL/L (ref 3–18)
AST SERPL-CCNC: 14 U/L (ref 10–38)
BASOPHILS # BLD: 0 K/UL (ref 0–0.1)
BASOPHILS NFR BLD: 0 % (ref 0–2)
BILIRUB SERPL-MCNC: 0.4 MG/DL (ref 0.2–1)
BUN SERPL-MCNC: 9 MG/DL (ref 7–18)
BUN/CREAT SERPL: 10 (ref 12–20)
CALCIUM SERPL-MCNC: 8.8 MG/DL (ref 8.5–10.1)
CHLORIDE SERPL-SCNC: 108 MMOL/L (ref 100–111)
CO2 SERPL-SCNC: 27 MMOL/L (ref 21–32)
CREAT SERPL-MCNC: 0.9 MG/DL (ref 0.6–1.3)
DIFFERENTIAL METHOD BLD: ABNORMAL
EOSINOPHIL # BLD: 0.1 K/UL (ref 0–0.4)
EOSINOPHIL NFR BLD: 1 % (ref 0–5)
ERYTHROCYTE [DISTWIDTH] IN BLOOD BY AUTOMATED COUNT: 14.7 % (ref 11.6–14.5)
GLOBULIN SER CALC-MCNC: 3 G/DL (ref 2–4)
GLUCOSE SERPL-MCNC: 101 MG/DL (ref 74–99)
HCT VFR BLD AUTO: 35.5 % (ref 35–45)
HGB BLD-MCNC: 11.5 G/DL (ref 12–16)
IMM GRANULOCYTES # BLD AUTO: 0 K/UL (ref 0–0.04)
IMM GRANULOCYTES NFR BLD AUTO: 0 % (ref 0–0.5)
LYMPHOCYTES # BLD: 3.7 K/UL (ref 0.9–3.6)
LYMPHOCYTES NFR BLD: 34 % (ref 21–52)
MAGNESIUM SERPL-MCNC: 2.2 MG/DL (ref 1.6–2.6)
MCH RBC QN AUTO: 26.8 PG (ref 24–34)
MCHC RBC AUTO-ENTMCNC: 32.4 G/DL (ref 31–37)
MCV RBC AUTO: 82.8 FL (ref 78–100)
MONOCYTES # BLD: 0.9 K/UL (ref 0.05–1.2)
MONOCYTES NFR BLD: 9 % (ref 3–10)
NEUTS SEG # BLD: 6 K/UL (ref 1.8–8)
NEUTS SEG NFR BLD: 56 % (ref 40–73)
NRBC # BLD: 0 K/UL (ref 0–0.01)
NRBC BLD-RTO: 0 PER 100 WBC
PLATELET # BLD AUTO: 268 K/UL (ref 135–420)
PMV BLD AUTO: 9.3 FL (ref 9.2–11.8)
POTASSIUM SERPL-SCNC: 3.8 MMOL/L (ref 3.5–5.5)
PROT SERPL-MCNC: 6.5 G/DL (ref 6.4–8.2)
RBC # BLD AUTO: 4.29 M/UL (ref 4.2–5.3)
SODIUM SERPL-SCNC: 138 MMOL/L (ref 136–145)
WBC # BLD AUTO: 10.8 K/UL (ref 4.6–13.2)

## 2024-04-13 PROCEDURE — 80053 COMPREHEN METABOLIC PANEL: CPT

## 2024-04-13 PROCEDURE — 6360000004 HC RX CONTRAST MEDICATION: Performed by: EMERGENCY MEDICINE

## 2024-04-13 PROCEDURE — 74177 CT ABD & PELVIS W/CONTRAST: CPT

## 2024-04-13 PROCEDURE — 6360000002 HC RX W HCPCS: Performed by: EMERGENCY MEDICINE

## 2024-04-13 PROCEDURE — 2580000003 HC RX 258: Performed by: EMERGENCY MEDICINE

## 2024-04-13 PROCEDURE — 83735 ASSAY OF MAGNESIUM: CPT

## 2024-04-13 PROCEDURE — 96374 THER/PROPH/DIAG INJ IV PUSH: CPT

## 2024-04-13 PROCEDURE — 85025 COMPLETE CBC W/AUTO DIFF WBC: CPT

## 2024-04-13 PROCEDURE — 96375 TX/PRO/DX INJ NEW DRUG ADDON: CPT

## 2024-04-13 RX ADMIN — IOPAMIDOL 100 ML: 612 INJECTION, SOLUTION INTRAVENOUS at 01:00

## 2024-04-13 RX ADMIN — ONDANSETRON 4 MG: 2 INJECTION INTRAMUSCULAR; INTRAVENOUS at 00:19

## 2024-04-13 RX ADMIN — KETOROLAC TROMETHAMINE 30 MG: 15 INJECTION, SOLUTION INTRAMUSCULAR; INTRAVENOUS at 00:20

## 2024-04-13 RX ADMIN — WATER 1000 MG: 1 INJECTION INTRAMUSCULAR; INTRAVENOUS; SUBCUTANEOUS at 00:20

## 2024-04-13 NOTE — ED PROVIDER NOTES
EMERGENCY DEPARTMENT HISTORY AND PHYSICAL EXAM    Date: 4/12/2024  Patient Name: Florentino Ghotra    History of Presenting Illness     Chief Complaint   Patient presents with    Abdominal Pain     Abd pain starting about one week ago, + nausea , no vomiting. Last menstrual short and light. Possibility of pregnancy. Dr hayes set for next Thursday as a general well appointment. Can't wait until then this apin is too severe. Mid lower abd pain. + lower back pain. + difficulty urinating.          History Provided By: Patient    Additional History (Context):   11:1 this 9 PM EDT  Florentino Ghotra is a 28 y.o. female with PMHX of syncopal episode, BV and GC, who presents to the emergency department C/O abdominal pain.  Patient states she has been having difficulties with pain discomfort when lying down flat.  She also has some dysuria.  Pain is located in the lower pelvic region right lower quadrant greater than left lower quadrant.  She has been having nausea vomiting.  Symptoms started insidiously almost a week ago have progressed.  There is no vomiting but she is nauseous had no diarrhea.  She is uncertain about pregnancy status stating possible her menses just about 2.  She has a PCP appointment coming follow a week but symptoms progressed coming tonight for evaluation.    Social History  She does drink alcohol smoke weed, no tobacco use.    Family History  Family history hypertension otherwise unremarkable.    PCP: Caro Topete APRN - NP    No current facility-administered medications for this encounter.     Current Outpatient Medications   Medication Sig Dispense Refill    methocarbamol (ROBAXIN) 500 MG tablet 1-2 tabs every 6 hours as needed pain spasm      naproxen (NAPROSYN) 500 MG tablet 1 tab po bid po prn pain      valACYclovir (VALTREX) 1 g tablet Take by mouth as needed         Past History     Past Medical History:  Past Medical History:   Diagnosis Date    Bacterial vaginosis   Not on file   Interpersonal Safety: Not on file   Utilities: Not on file         Procedures:  Procedures    ED Course:   11:19 PM EDT: Initial assessment performed. The patients presenting problems have been discussed, and they are in agreement with the care plan formulated and outlined with them.  I have encouraged them to ask questions as they arise throughout their visit.         Diagnosis and Disposition       DISCHARGE NOTE:  2:45 AM  Florentino Ghotra's  results have been reviewed with her.  She has been counseled regarding her diagnosis, treatment, and plan.  She verbally conveys understanding and agreement of the signs, symptoms, diagnosis, treatment and prognosis and additionally agrees to follow up as discussed.  She also agrees with the care-plan and conveys that all of her questions have been answered.  I have also provided discharge instructions for her that include: educational information regarding their diagnosis and treatment, and list of reasons why they would want to return to the ED prior to their follow-up appointment, should her condition change. She has been provided with education for proper emergency department utilization.     CLINICAL IMPRESSION:    1. Lower abdominal pain    2. Acute UTI        PLAN:  1. D/C Home  2.      Medication List        ASK your doctor about these medications      methocarbamol 500 MG tablet  Commonly known as: ROBAXIN     naproxen 500 MG tablet  Commonly known as: NAPROSYN     valACYclovir 1 g tablet  Commonly known as: VALTREX            3. [unfilled]  _______________________________    This note was partially transcribed via voice recognition software. Although efforts have been made to catch any discrepancies, it may contain sound alike words, grammatical errors, or nonsensical words.            Dick Roberts MD  04/14/24 [id number]

## 2024-04-13 NOTE — ED TRIAGE NOTES
Abd pain starting about one week ago, + nausea , no vomiting. Last menstrual short and light. Possibility of pregnancy. Dr hayes set for next Thursday as a general well appointment. Can't wait until then this apin is too severe. Mid lower abd pain. + lower back pain. + difficulty urinating.

## 2024-04-16 LAB
C TRACH RRNA SPEC QL NAA+PROBE: NEGATIVE
N GONORRHOEA RRNA SPEC QL NAA+PROBE: NEGATIVE
SPECIMEN SOURCE: NORMAL
T VAGINALIS RRNA SPEC QL NAA+PROBE: NEGATIVE

## 2024-04-17 RX ORDER — CEPHALEXIN 500 MG/1
500 CAPSULE ORAL 2 TIMES DAILY
Qty: 14 CAPSULE | Refills: 0 | Status: SHIPPED | OUTPATIENT
Start: 2024-04-17 | End: 2024-04-24

## 2024-04-17 RX ORDER — FLUCONAZOLE 150 MG/1
150 TABLET ORAL ONCE
Qty: 1 TABLET | Refills: 0 | Status: SHIPPED | OUTPATIENT
Start: 2024-04-17 | End: 2024-04-17

## 2024-04-17 NOTE — PROGRESS NOTES
Patient called and stated she was verbally instructed that she was supposed to be taking an antibiotic at home and was post to get yeast infection medication but never received them at the [facility] in [place].  I have reviewed her chart and placed her on Keflex twice a day for 7 days and sent her in some Diflucan.

## 2025-03-06 NOTE — MR AVS SNAPSHOT
Visit Information Date & Time Provider Department Dept. Phone Encounter #  
 12/13/2017 10:30 AM Altagraciasadiq LouisCatalina [id number] Follow-up Instructions Return in about 1 week (around 12/20/2017). Follow-up and Disposition History Upcoming Health Maintenance Date Due  
 HPV AGE 9Y-34Y (1 of 3 - Female 3 Dose Series) 12/4/2006 Influenza Age 5 to Adult 8/1/2017 OB 3RD TRIMESTER TDAP 9/28/2017 PAP AKA CERVICAL CYTOLOGY 5/31/2020 Allergies as of 12/13/2017  Review Complete On: 12/13/2017 By: Altagracia Louis MD  
 No Known Allergies Current Immunizations  Never Reviewed No immunizations on file. Not reviewed this visit You Were Diagnosed With   
  
 Codes Comments Encounter for supervision of other normal pregnancy, third trimester    -  Primary ICD-10-CM: Z34.83 ICD-9-CM: V22.1 37 weeks gestation of pregnancy     ICD-10-CM: Z3A.37 
ICD-9-CM: V22.2 Vitals BP Pulse Weight(growth percentile) LMP BMI OB Status 132/84 72 240 lb (108.9 kg) 03/26/2017 38.74 kg/m2 Pregnant Smoking Status Former Smoker BMI and BSA Data Body Mass Index Body Surface Area 38.74 kg/m 2 2.25 m 2 Preferred Pharmacy Pharmacy Name Phone [id number] - [address] [phone] Your Updated Medication List  
  
   
This list is accurate as of: 12/13/17 10:55 AM.  Always use your most recent med list. AMBULATORY BREAST PUMP Use breast pump as directed for breast feeding  
  
 ferrous fumarate 324 mg (106 mg iron) Tab Take 1 Tab by mouth two (2) times a day. prenatal vit-iron fumarate-fa 27 mg iron- 0.8 mg Tab tablet Commonly known as:  PRENATAL VITAMIN Take 1 Tab by mouth daily. Follow-up Instructions Return in about 1 week (around 12/20/2017). Patient Instructions Labor Induction: Care Instructions Your Care Instructions If you pass your due date and your labor does not start on its own, your doctor may want to try to start (induce) labor. Your doctor may suggest doing this for other reasons. It may be a good idea to induce labor if you have another problem. For example, it may be done if you have high blood pressure. Or it may be a good idea if the placenta can no longer give enough support to the baby. There are several ways to induce labor. · Medicine may be used to make the cervix soft and help it thin. · Medicine may be used to cause the uterus to contract. · A balloon catheter may be used to help the cervix open. · Your doctor may sweep the membranes or break the amniotic sac to start or increase labor. This may be done if your cervix is soft and slightly open. You may need to have your baby delivered through a cut (incision) in your belly. This is called a  section, or a . It may be done if your doctor has used medicine but your labor is not progressing. After you have your baby, you should not have any side effects from the medicine used to start labor. Follow-up care is a key part of your treatment and safety. Be sure to make and go to all appointments, and call your doctor if you are having problems. It's also a good idea to know your test results and keep a list of the medicines you take. When should your labor be induced? · Your pregnancy has gone 1 to 2 weeks past your expected due date. · You have a problem that may harm your health or the health of your baby if you continue to be pregnant. This includes high blood pressure, preeclampsia, and diabetes. · Your water breaks, but labor does not start. When should you call for help? Watch closely for changes in your health, and be sure to contact your doctor if you have questions about inducing labor. Where can you learn more? Go to http://venecia-trudy.info/. Enter D861 in the search box to learn more about \"Labor Induction: Care Instructions. \" Current as of: March 16, 2017 Content Version: 11.4 © 4471-4749 [employer]. Care instructions adapted under license by [employer] (which disclaims liability or warranty for this information). If you have questions about a medical condition or this instruction, always ask your healthcare professional. [place] [address] any warranty or liability for your use of this information. Week 37 of Your Pregnancy: Care Instructions Your Care Instructions You are near the end of your pregnancy-and you're probably pretty uncomfortable. It may be harder to walk around. Lying down probably isn't comfortable either. You may have trouble getting to sleep or staying asleep. Most women deliver their babies between 40 and 41 weeks. This is a good time to think about packing a bag for the hospital with items you'll need. Then you'll be ready when labor starts. Follow-up care is a key part of your treatment and safety. Be sure to make and go to all appointments, and call your doctor if you are having problems. It's also a good idea to know your test results and keep a list of the medicines you take. How can you care for yourself at home? Learn about breastfeeding · Breastfeeding is best for your baby and good for you. · Breast milk has antibodies to help your baby fight infections. · Mothers who breastfeed often lose weight faster, because making milk burns calories. · Learning the best ways to hold your baby will make breastfeeding easier. · Let your partner bathe and diaper the baby to keep your partner from feeling left out. Snuggle together when you breastfeed. · You may want to learn how to use a breast pump and store your milk. · If you choose to bottle feed, make the feeding feel like breastfeeding so you can bond with your baby. Always hold your baby and the bottle.  Do not prop bottles or let your baby fall asleep with a bottle. Learn about crying · It is common for babies to cry for 1 to 3 hours a day. Some cry more, some cry less. · Babies don't cry to make you upset or because you are a bad parent. · Crying is how your baby communicates. Your baby may be hungry; have gas; need a diaper change; or feel cold, warm, tired, lonely, or tense. Sometimes babies cry for unknown reasons. · If you respond to your baby's needs, he or she will learn to trust you. · Try to stay calm when your baby cries. Your baby may get more upset if he or she senses that you are upset. Know how to care for your  · Your baby's umbilical cord stump will drop off on its own, usually between 1 and 2 weeks. To care for your baby's umbilical cord area: ¨ Clean the area at the bottom of the cord 2 or 3 times a day. ¨ Pay special attention to the area where the cord attaches to the skin. ¨ Keep the diaper folded below the cord. ¨ Use a damp washcloth or cotton ball to sponge bathe your baby until the stump has come off. · Your baby's first dark stool is called meconium. After the meconium is passed, your baby will develop his or her own bowel pattern. ¨ Some babies, especially  babies, have several bowel movements a day. Others have one or two a day, or one every 2 to 3 days. ¨  babies often have loose, yellow stools. Formula-fed babies have more formed stools. ¨ If your baby's stools look like little pellets, he or she is constipated. After 2 days of constipation, call your baby's doctor. · If your baby will be circumcised, you can care for him at home. ¨ Gently rinse his penis with warm water after every diaper change. Do not try to remove the film that forms on the penis. This film will go away on its own. Pat dry. ¨ Put petroleum ointment, such as Vaseline, on the area of the diaper that will touch your baby's penis.  This will keep the diaper from sticking to your baby. ¨ Ask the doctor about giving your baby acetaminophen (Tylenol) for pain. Where can you learn more? Go to http://venecia-trudy.info/. Enter 68 21 97 in the search box to learn more about \"Week 37 of Your Pregnancy: Care Instructions. \" Current as of: March 16, 2017 Content Version: 11.4 © 7177-2077 [employer]. Care instructions adapted under license by [employer] (which disclaims liability or warranty for this information). If you have questions about a medical condition or this instruction, always ask your healthcare professional. [place] [address] any warranty or liability for your use of this information. Please provide this summary of care documentation to your next provider. Your primary care clinician is listed as Elzbieta Renteria. If you have any questions after today's visit, please call [phone]. Principal Discharge DX:	Chest pain  Secondary Diagnosis:	Shortness of breath   1